# Patient Record
Sex: MALE | Race: ASIAN | NOT HISPANIC OR LATINO | Employment: OTHER | URBAN - METROPOLITAN AREA
[De-identification: names, ages, dates, MRNs, and addresses within clinical notes are randomized per-mention and may not be internally consistent; named-entity substitution may affect disease eponyms.]

---

## 2023-08-08 ENCOUNTER — APPOINTMENT (OUTPATIENT)
Dept: MRI IMAGING | Facility: HOSPITAL | Age: 63
DRG: 066 | End: 2023-08-08
Payer: MEDICAID

## 2023-08-08 ENCOUNTER — APPOINTMENT (OUTPATIENT)
Dept: CT IMAGING | Facility: HOSPITAL | Age: 63
DRG: 066 | End: 2023-08-08
Payer: MEDICAID

## 2023-08-08 ENCOUNTER — APPOINTMENT (OUTPATIENT)
Dept: GENERAL RADIOLOGY | Facility: HOSPITAL | Age: 63
DRG: 066 | End: 2023-08-08
Payer: MEDICAID

## 2023-08-08 ENCOUNTER — APPOINTMENT (OUTPATIENT)
Dept: CARDIOLOGY | Facility: HOSPITAL | Age: 63
DRG: 066 | End: 2023-08-08
Payer: MEDICAID

## 2023-08-08 ENCOUNTER — HOSPITAL ENCOUNTER (INPATIENT)
Facility: HOSPITAL | Age: 63
LOS: 6 days | Discharge: NURSING, REHAB OR LONG-TERM CARE | DRG: 066 | End: 2023-08-14
Attending: EMERGENCY MEDICINE | Admitting: INTERNAL MEDICINE
Payer: MEDICAID

## 2023-08-08 DIAGNOSIS — I63.9 CEREBROVASCULAR ACCIDENT (CVA), UNSPECIFIED MECHANISM: Primary | ICD-10-CM

## 2023-08-08 DIAGNOSIS — Z78.9 DECREASED ACTIVITIES OF DAILY LIVING (ADL): ICD-10-CM

## 2023-08-08 DIAGNOSIS — R13.12 DYSPHAGIA, OROPHARYNGEAL: ICD-10-CM

## 2023-08-08 DIAGNOSIS — R73.9 HYPERGLYCEMIA: ICD-10-CM

## 2023-08-08 DIAGNOSIS — I10 HYPERTENSION, UNSPECIFIED TYPE: ICD-10-CM

## 2023-08-08 DIAGNOSIS — R26.2 DIFFICULTY IN WALKING: ICD-10-CM

## 2023-08-08 LAB
25(OH)D3 SERPL-MCNC: 20.6 NG/ML (ref 30–100)
ALBUMIN SERPL-MCNC: 4.6 G/DL (ref 3.5–5.2)
ALBUMIN/GLOB SERPL: 1.4 G/DL
ALP SERPL-CCNC: 96 U/L (ref 39–117)
ALT SERPL W P-5'-P-CCNC: 25 U/L (ref 1–41)
ANION GAP SERPL CALCULATED.3IONS-SCNC: 13.9 MMOL/L (ref 5–15)
APTT PPP: 24.5 SECONDS (ref 24.2–34.2)
AST SERPL-CCNC: 23 U/L (ref 1–40)
BASOPHILS # BLD AUTO: 0.03 10*3/MM3 (ref 0–0.2)
BASOPHILS NFR BLD AUTO: 0.5 % (ref 0–1.5)
BILIRUB SERPL-MCNC: 0.5 MG/DL (ref 0–1.2)
BILIRUB UR QL STRIP: NEGATIVE
BUN SERPL-MCNC: 15 MG/DL (ref 8–23)
BUN/CREAT SERPL: 13.4 (ref 7–25)
CALCIUM SPEC-SCNC: 10.9 MG/DL (ref 8.6–10.5)
CHLORIDE SERPL-SCNC: 95 MMOL/L (ref 98–107)
CLARITY UR: CLEAR
CO2 SERPL-SCNC: 25.1 MMOL/L (ref 22–29)
COLOR UR: YELLOW
CREAT SERPL-MCNC: 1.12 MG/DL (ref 0.76–1.27)
DEPRECATED RDW RBC AUTO: 39.6 FL (ref 37–54)
EGFRCR SERPLBLD CKD-EPI 2021: 73.8 ML/MIN/1.73
EOSINOPHIL # BLD AUTO: 0.04 10*3/MM3 (ref 0–0.4)
EOSINOPHIL NFR BLD AUTO: 0.6 % (ref 0.3–6.2)
ERYTHROCYTE [DISTWIDTH] IN BLOOD BY AUTOMATED COUNT: 12.7 % (ref 12.3–15.4)
GLOBULIN UR ELPH-MCNC: 3.4 GM/DL
GLUCOSE BLDC GLUCOMTR-MCNC: 178 MG/DL (ref 70–99)
GLUCOSE BLDC GLUCOMTR-MCNC: 199 MG/DL (ref 70–99)
GLUCOSE BLDC GLUCOMTR-MCNC: 441 MG/DL (ref 70–99)
GLUCOSE SERPL-MCNC: 425 MG/DL (ref 65–99)
GLUCOSE UR STRIP-MCNC: ABNORMAL MG/DL
HCT VFR BLD AUTO: 40.7 % (ref 37.5–51)
HGB BLD-MCNC: 14.7 G/DL (ref 13–17.7)
HGB UR QL STRIP.AUTO: NEGATIVE
HOLD SPECIMEN: NORMAL
HOLD SPECIMEN: NORMAL
IMM GRANULOCYTES # BLD AUTO: 0.01 10*3/MM3 (ref 0–0.05)
IMM GRANULOCYTES NFR BLD AUTO: 0.2 % (ref 0–0.5)
INR PPP: 0.97 (ref 0.86–1.15)
KETONES UR QL STRIP: NEGATIVE
LEUKOCYTE ESTERASE UR QL STRIP.AUTO: NEGATIVE
LYMPHOCYTES # BLD AUTO: 1.86 10*3/MM3 (ref 0.7–3.1)
LYMPHOCYTES NFR BLD AUTO: 29.3 % (ref 19.6–45.3)
MCH RBC QN AUTO: 30.8 PG (ref 26.6–33)
MCHC RBC AUTO-ENTMCNC: 36.1 G/DL (ref 31.5–35.7)
MCV RBC AUTO: 85.3 FL (ref 79–97)
MONOCYTES # BLD AUTO: 0.47 10*3/MM3 (ref 0.1–0.9)
MONOCYTES NFR BLD AUTO: 7.4 % (ref 5–12)
NEUTROPHILS NFR BLD AUTO: 3.93 10*3/MM3 (ref 1.7–7)
NEUTROPHILS NFR BLD AUTO: 62 % (ref 42.7–76)
NITRITE UR QL STRIP: NEGATIVE
NRBC BLD AUTO-RTO: 0 /100 WBC (ref 0–0.2)
PH UR STRIP.AUTO: 6 [PH] (ref 5–8)
PHOSPHATE SERPL-MCNC: 3.6 MG/DL (ref 2.5–4.5)
PLATELET # BLD AUTO: 192 10*3/MM3 (ref 140–450)
PMV BLD AUTO: 11.3 FL (ref 6–12)
POTASSIUM SERPL-SCNC: 4.2 MMOL/L (ref 3.5–5.2)
PROT SERPL-MCNC: 8 G/DL (ref 6–8.5)
PROT UR QL STRIP: NEGATIVE
PROTHROMBIN TIME: 13 SECONDS (ref 11.8–14.9)
PTH-INTACT SERPL-MCNC: 42.6 PG/ML (ref 15–65)
QT INTERVAL: 363 MS
RBC # BLD AUTO: 4.77 10*6/MM3 (ref 4.14–5.8)
SODIUM SERPL-SCNC: 134 MMOL/L (ref 136–145)
SP GR UR STRIP: >1.03 (ref 1–1.03)
TROPONIN T SERPL HS-MCNC: 16 NG/L
UROBILINOGEN UR QL STRIP: ABNORMAL
WBC NRBC COR # BLD: 6.34 10*3/MM3 (ref 3.4–10.8)
WHOLE BLOOD HOLD COAG: NORMAL
WHOLE BLOOD HOLD SPECIMEN: NORMAL

## 2023-08-08 PROCEDURE — 0042T HC CT CEREBRAL PERFUSION W/WO CONTRAST: CPT

## 2023-08-08 PROCEDURE — 93880 EXTRACRANIAL BILAT STUDY: CPT | Performed by: SURGERY

## 2023-08-08 PROCEDURE — 81003 URINALYSIS AUTO W/O SCOPE: CPT | Performed by: EMERGENCY MEDICINE

## 2023-08-08 PROCEDURE — 70496 CT ANGIOGRAPHY HEAD: CPT

## 2023-08-08 PROCEDURE — 85610 PROTHROMBIN TIME: CPT | Performed by: EMERGENCY MEDICINE

## 2023-08-08 PROCEDURE — 82948 REAGENT STRIP/BLOOD GLUCOSE: CPT

## 2023-08-08 PROCEDURE — 70450 CT HEAD/BRAIN W/O DYE: CPT

## 2023-08-08 PROCEDURE — 93880 EXTRACRANIAL BILAT STUDY: CPT

## 2023-08-08 PROCEDURE — 82306 VITAMIN D 25 HYDROXY: CPT | Performed by: INTERNAL MEDICINE

## 2023-08-08 PROCEDURE — 84484 ASSAY OF TROPONIN QUANT: CPT | Performed by: EMERGENCY MEDICINE

## 2023-08-08 PROCEDURE — 83036 HEMOGLOBIN GLYCOSYLATED A1C: CPT | Performed by: INTERNAL MEDICINE

## 2023-08-08 PROCEDURE — 71045 X-RAY EXAM CHEST 1 VIEW: CPT

## 2023-08-08 PROCEDURE — 85025 COMPLETE CBC W/AUTO DIFF WBC: CPT | Performed by: EMERGENCY MEDICINE

## 2023-08-08 PROCEDURE — 85730 THROMBOPLASTIN TIME PARTIAL: CPT | Performed by: EMERGENCY MEDICINE

## 2023-08-08 PROCEDURE — 63710000001 INSULIN REGULAR HUMAN PER 5 UNITS: Performed by: EMERGENCY MEDICINE

## 2023-08-08 PROCEDURE — 99285 EMERGENCY DEPT VISIT HI MDM: CPT

## 2023-08-08 PROCEDURE — 99222 1ST HOSP IP/OBS MODERATE 55: CPT | Performed by: PSYCHIATRY & NEUROLOGY

## 2023-08-08 PROCEDURE — 80053 COMPREHEN METABOLIC PANEL: CPT | Performed by: EMERGENCY MEDICINE

## 2023-08-08 PROCEDURE — 94761 N-INVAS EAR/PLS OXIMETRY MLT: CPT

## 2023-08-08 PROCEDURE — 83970 ASSAY OF PARATHORMONE: CPT | Performed by: INTERNAL MEDICINE

## 2023-08-08 PROCEDURE — 94799 UNLISTED PULMONARY SVC/PX: CPT

## 2023-08-08 PROCEDURE — 70551 MRI BRAIN STEM W/O DYE: CPT

## 2023-08-08 PROCEDURE — 99223 1ST HOSP IP/OBS HIGH 75: CPT | Performed by: INTERNAL MEDICINE

## 2023-08-08 PROCEDURE — 70498 CT ANGIOGRAPHY NECK: CPT

## 2023-08-08 PROCEDURE — 84100 ASSAY OF PHOSPHORUS: CPT | Performed by: INTERNAL MEDICINE

## 2023-08-08 PROCEDURE — 25510000001 IOPAMIDOL PER 1 ML

## 2023-08-08 PROCEDURE — 93005 ELECTROCARDIOGRAM TRACING: CPT | Performed by: EMERGENCY MEDICINE

## 2023-08-08 PROCEDURE — 25510000001 IOPAMIDOL PER 1 ML: Performed by: EMERGENCY MEDICINE

## 2023-08-08 RX ORDER — ASPIRIN 81 MG/1
81 TABLET, CHEWABLE ORAL DAILY
Status: DISCONTINUED | OUTPATIENT
Start: 2023-08-09 | End: 2023-08-14 | Stop reason: HOSPADM

## 2023-08-08 RX ORDER — SODIUM CHLORIDE 0.9 % (FLUSH) 0.9 %
10 SYRINGE (ML) INJECTION EVERY 12 HOURS SCHEDULED
Status: DISCONTINUED | OUTPATIENT
Start: 2023-08-08 | End: 2023-08-14 | Stop reason: HOSPADM

## 2023-08-08 RX ORDER — DEXTROSE MONOHYDRATE 25 G/50ML
25 INJECTION, SOLUTION INTRAVENOUS
Status: DISCONTINUED | OUTPATIENT
Start: 2023-08-08 | End: 2023-08-14 | Stop reason: HOSPADM

## 2023-08-08 RX ORDER — ASPIRIN 81 MG/1
81 TABLET, CHEWABLE ORAL ONCE
Status: DISCONTINUED | OUTPATIENT
Start: 2023-08-08 | End: 2023-08-08

## 2023-08-08 RX ORDER — ATORVASTATIN CALCIUM 40 MG/1
80 TABLET, FILM COATED ORAL NIGHTLY
Status: DISCONTINUED | OUTPATIENT
Start: 2023-08-08 | End: 2023-08-14 | Stop reason: HOSPADM

## 2023-08-08 RX ORDER — HYDRALAZINE HYDROCHLORIDE 20 MG/ML
10 INJECTION INTRAMUSCULAR; INTRAVENOUS EVERY 6 HOURS PRN
Status: DISCONTINUED | OUTPATIENT
Start: 2023-08-08 | End: 2023-08-14 | Stop reason: HOSPADM

## 2023-08-08 RX ORDER — SODIUM CHLORIDE 9 MG/ML
40 INJECTION, SOLUTION INTRAVENOUS AS NEEDED
Status: DISCONTINUED | OUTPATIENT
Start: 2023-08-08 | End: 2023-08-14 | Stop reason: HOSPADM

## 2023-08-08 RX ORDER — NICOTINE POLACRILEX 4 MG
15 LOZENGE BUCCAL
Status: DISCONTINUED | OUTPATIENT
Start: 2023-08-08 | End: 2023-08-14 | Stop reason: HOSPADM

## 2023-08-08 RX ORDER — CLOPIDOGREL BISULFATE 75 MG/1
75 TABLET ORAL DAILY
Status: DISCONTINUED | OUTPATIENT
Start: 2023-08-09 | End: 2023-08-14 | Stop reason: HOSPADM

## 2023-08-08 RX ORDER — ASPIRIN 300 MG/1
300 SUPPOSITORY RECTAL DAILY
Status: DISCONTINUED | OUTPATIENT
Start: 2023-08-09 | End: 2023-08-09

## 2023-08-08 RX ORDER — ENOXAPARIN SODIUM 100 MG/ML
40 INJECTION SUBCUTANEOUS DAILY
Status: DISCONTINUED | OUTPATIENT
Start: 2023-08-09 | End: 2023-08-14 | Stop reason: HOSPADM

## 2023-08-08 RX ORDER — SODIUM CHLORIDE 0.9 % (FLUSH) 0.9 %
10 SYRINGE (ML) INJECTION AS NEEDED
Status: DISCONTINUED | OUTPATIENT
Start: 2023-08-08 | End: 2023-08-14 | Stop reason: HOSPADM

## 2023-08-08 RX ORDER — CLOPIDOGREL BISULFATE 75 MG/1
300 TABLET ORAL ONCE
Status: DISCONTINUED | OUTPATIENT
Start: 2023-08-08 | End: 2023-08-08

## 2023-08-08 RX ADMIN — IOPAMIDOL 80 ML: 755 INJECTION, SOLUTION INTRAVENOUS at 12:42

## 2023-08-08 RX ADMIN — Medication 10 ML: at 22:00

## 2023-08-08 RX ADMIN — IOPAMIDOL 80 ML: 755 INJECTION, SOLUTION INTRAVENOUS at 12:32

## 2023-08-08 RX ADMIN — INSULIN HUMAN 10 UNITS: 100 INJECTION, SOLUTION PARENTERAL at 15:49

## 2023-08-08 NOTE — H&P
[facility]   HOSPITALIST HISTORY AND PHYSICAL  Date: 2023   Patient Name: Italo Pina  : 1960  MRN: [id number]  Primary Care Physician:  Carmen, No Known  Date of admission: 2023    Subjective   Subjective     Chief Complaint: Trouble swallowing    HPI:    Italo Pina is a 63 y.o. male with no reported past medical history who presented with trouble swallowing and slurred speech. His family at bedside provides additional history. At approximately 0930 today, patients wife noticed a right facial droop and slurred speech. Last known normal time was 1730 last night although of note, patient had some trouble swallowing last night which is not normal for him.     On presentation afebrile.  Heart and respiratory rate within normal limits.  Blood pressure 200/125.  No known history of hypertension and does not take any medications.  He is not a candidate for thrombolytics.  CT head without contrast did not report acute infarct or hemorrhage.  CT cerebral perfusion study no core infarction noted.  CT angiogram head and neck no large vessel occlusion or hemodynamically significant stenoses but there was diffuse atherosclerotic changes and narrowing noted within the cavernous segments of the internal carotid arteries bilaterally.  Labs notable for a glucose of 425 and calcium of 10.9. Teleneurology was consulted in the emergency room.  He was admitted under the hospitalist service for further evaluation and management.    Personal History     Past Medical History:  No known past medical history    Past Surgical History:  Knee arthroscopy    Family History:   Mom: Heart disease and hypertension    Social History:   No alcohol use  Former smoker quit in 2017    Home Medications:    NONE    Allergies:  No Known Allergies    Review of Systems  Constitutional:  No Fever, No Chills  HEENT:  +Trouble swallowing, No Hearing Changes, No Visual changes  Cardiovascular:  Denied complaints  Respiratory:   Denied complaints  Gastrointestinal:  Denied complaints  Genitourinary:  Denied complaints  Musculoskeletal:  Denied complaints  Neuro:  No headache, No confusion, No focal weakness, no change or loss of sensation in extremities, +unsteady gait  Heme/Lymph:  Denied complaints  Endocrine: + Polyuria and polydipsia    Objective   Objective     Vitals:   Temp:  [98.4 øF (36.9 øC)] 98.4 øF (36.9 øC)  Heart Rate:  [79-91] 86  Resp:  [16-18] 16  BP: (165-200)/(112-125) 189/116    Physical Exam    Constitutional: Awake, conversant, NAD   Eyes: Pupils equal and reactive, EOMI, no conjunctival injection   HENT: NCAT, nares patent, MMM   Neck: Supple, trachea midline   Respiratory: Equal and clear to auscultation bilaterally, nonlabored respirations    Cardiovascular: RRR, no murmurs, no pedal edema   Gastrointestinal: Positive bowel sounds, soft, nontender, nondistended   Musculoskeletal: No gross joint deformities, no clubbing or cyanosis to extremities   Neurologic: Alert, Cranial Nerves grossly intact, mild dysarthria   Skin: Warm, no rashes or wounds     Result Review    Result Review:  I have personally reviewed the results from the time of this admission to 8/8/2023 15:22 EDT and agree with these findings:  [x]  Laboratory  CBC          8/8/2023    12:33   CBC   WBC 6.34    RBC 4.77    Hemoglobin 14.7    Hematocrit 40.7    MCV 85.3    MCH 30.8    MCHC 36.1    RDW 12.7    Platelets 192      CMP          8/8/2023    12:33   CMP   Glucose 425    BUN 15    Creatinine 1.12    EGFR 73.8    Sodium 134    Potassium 4.2    Chloride 95    Calcium 10.9    Total Protein 8.0    Albumin 4.6    Globulin 3.4    Total Bilirubin 0.5    Alkaline Phosphatase 96    AST (SGOT) 23    ALT (SGPT) 25    Albumin/Globulin Ratio 1.4    BUN/Creatinine Ratio 13.4    Anion Gap 13.9        []  Microbiology  [x]  Radiology    CT Angiogram Neck  Result Date: 8/8/2023    1. No evidence of large vessel occlusion or aneurysm formation noted.   Atherosclerotic changes noted as above most notable within the left-sided vertebral artery at the base of the brain.  Diffuse atherosclerotic changes and narrowing noted within the cavernous segments of the internal carotid arteries bilaterally.  Atherosclerotic changes without flow limiting stenosis noted within the carotid arteries as above 2. Incidental note of heterogeneous appearance of the right palatine tonsil which could represent underlying tonsillitis.  Recommend clinical correlation and follow-up as clinically indicated.  Consider direct visualization.     DEVEN BURROUGHS MD       Electronically Signed and Approved By: DEVEN BURROUGHS MD on 8/08/2023 at 13:59             XR Chest 1 View  Result Date: 8/8/2023    No radiographic findings of acute cardiopulmonary abnormality.       BERNA ASHLEY MD       Electronically Signed and Approved By: BERNA ASHLEY MD on 8/08/2023 at 13:27             CT Head Without Contrast Stroke Protocol  Result Date: 8/8/2023    1. No acute hemorrhage or mass effect 2. Extensive white matter changes within the supratentorial brain compatible small-vessel ischemic disease.  Age-indeterminate small focus of low attenuation on the right side of the kenia.  MRI would be more sensitive for evaluation of acute ischemic change   Findings were communicated to the emergency department attending covering the patient at 12:09 p.m.     DEVEN BURROUGHS MD       Electronically Signed and Approved By: DEVEN BURROUGHS MD on 8/08/2023 at 12:12             CT Angiogram Head w AI Analysis of LVO  Result Date: 8/8/2023    1. No evidence of large vessel occlusion or aneurysm formation noted.  Atherosclerotic changes noted as above most notable within the left-sided vertebral artery at the base of the brain.  Diffuse atherosclerotic changes and narrowing noted within the cavernous segments of the internal carotid arteries bilaterally.  Atherosclerotic changes without flow limiting stenosis noted  within the carotid arteries as above 2. Incidental note of heterogeneous appearance of the right palatine tonsil which could represent underlying tonsillitis.  Recommend clinical correlation and follow-up as clinically indicated.  Consider direct visualization.     DEVEN BURROUGHS MD       Electronically Signed and Approved By: DEVEN BURROUGHS MD on 8/08/2023 at 13:59             CT CEREBRAL PERFUSION WITH & WITHOUT CONTRAST    Result Date: 8/8/2023      1. Examination somewhat degraded by patient motion artifact 2. 5 mL focus of prolonged T-max greater than 6 seconds in the posterosuperior cerebellum suspected to be artifactual related to the motion.  Ischemic brain at risk would also be in the differential.      Bill Barker M.D.       Electronically Signed and Approved By: Bill Barker M.D. on 8/08/2023 at 12:55              [x]  EKG/Telemetry monitor reviewed: Normal sinus rhythm  []  Cardiology/Vascular   []  Pathology  []  Old records  []  Other:      Assessment & Plan   Assessment / Plan     Assessment:  Dysphagia  Dysarthria  Concern for CVA  Uncontrolled hypertension  Hyperglycemia  Hypercalcemia      Plan:     Admit to hospitalist service.  Telemetry bed  Teleneurology consulted, appreciate recommendations  Obtain MRI brain without contrast  Continue neurochecks/NIHSS per protocol  Continue dual antiplatelet therapy per neurology recommendation  Allow for permissive hypertension x24 hours.  IV antihypertensives prn for SBP/DBP greater than 220/120.  Check lipid panel.  Start high intensity statin.  Obtain TTE  Check carotid duplex ultrasound  Monitor on telemetry  Received 10 units of regular insulin in ED.  Will place on moderate dose SSI and check hemoglobin A1c.  Check phosphorus, vitamin D, PTH level  Failed bedside swallow evaluation.  Keep strict NPO pending SLP evaluation. Aspiration precautions.  PT/OT evaluation.  Fall precautions    DVT ppx: Lovenox 40 mg daily    CODE STATUS:    Code Status  (Patient has no pulse and is not breathing): CPR (Attempt to Resuscitate)  Medical Interventions (Patient has pulse or is breathing): Full Support    Admission Status:  I believe this patient meets INPATIENT status.    Electronically signed by Gaudencio Franz DO, 08/08/23, 3:22 PM EDT.

## 2023-08-08 NOTE — CONSULTS
[facility] Tele[facility]urology Consult Services      Patient Name:   Italo Pina  YOB: 1960  Identification Number:   MRN - [id number]  Date of Service:   08/08/2023 11:51:45    Diagnosis:        R26.81 - Unsteady gait    Impression:       Patient is a very pleasant 63-year-old man. He presents to the emergency department with complaints of dysphagia, unsteady gait as well as dysarthria. CT scan of the head is unremarkable. He was last known wall greater than 4.5 hours prior to presentation. He does not qualify for thrombolytics.      His symptoms are suspicious for a posterior circulation infarct. I would recommend an MRI scan of the brain without contrast. He will also need a CT angiogram head and neck and ECHO.      For the time being, I would recommended loading with Plavix 300 mg x1 in initiating aspirin 81 mg.      Further neurology recommendations can be made based on the results of his workup. Thank you for allowing me to participate in his care.    Our recommendations are outlined below.    Recommendations:          Stroke/Telemetry Floor        Neuro Checks        Bedside Swallow Eval        DVT Prophylaxis        IV Fluids, Normal Saline        Head of Bed 30 Degrees        Euglycemia and Avoid Hyperthermia (PRN Acetaminophen)        Bolus with Clopidogrel 300 mg bolus x1 and initiate dual antiplatelet therapy with Aspirin 81 mg daily and Clopidogrel 75 mg daily    Recommended Scan:       MRI Head Without Contrast       Echocardiogram - Transthoracic Echocardiogram    Lipid Panel to Be Obtained, if Not Done in the Last 30 Days    Therapies:        Physical Therapy, Occupational Therapy, Speech Therapy Assessment When Applicable    Dysphagia:        Swallow Evaluation, Bedside        NPO Until Swallow Evaluation    DVT prophylaxis:        Choice of Primary Team    Disposition:        Neurology Follow Up Recommended    Sign Out:        Discussed with Emergency Department  Provider        ------------------------------------------------------------------------------    Advanced Imaging:  Will be performed.      Metrics:  Last Known Well: 08/07/2023 19:00:00  [facility] Notification Time: 08/08/2023 11:51:26  Arrival Time: 08/08/2023 11:44:00  Stamp Time: 08/08/2023 11:51:45  Initial Response Time: 08/08/2023 11:56:33  Symptoms: dysphagia, unsteadiness.  Initial patient interaction: 08/08/2023 12:00:38  NIHSS Assessment Completed: 08/08/2023 12:03:01  Patient is not a candidate for Thrombolytic.  Thrombolytic Medical Decision: 08/08/2023 12:03:02  Patient was not deemed candidate for Thrombolytic because of following reasons:  Last Well Known Above 4.5 Hours.    I personally Reviewed the CT Head and it Showed No acute intracranial abnormality.    Narrative & Impression  PROCEDURE: CT HEAD WO CONTRAST STROKE PROTOCOL    COMPARISON: None  INDICATIONS: Neuro deficit, acute, stroke suspected. difficulty swallowing    PROTOCOL: Standard imaging protocol performed    RADIATION: DLP: 1018.2mGy*cm  MA and/or KV was adjusted to minimize radiation dose.      TECHNIQUE: After obtaining the patient's consent, CT images were obtained without non-ionic  intravenous contrast material.    FINDINGS:  Brain: Mild diffuse atrophy noted. No suspicious extra-axial fluid collections noted. No acute  intracranial hemorrhage or mass effect. Moderate to advanced patchy confluent white matter changes  within the supratentorial brain compatible with small vessel ischemic disease in this age group.    Orbits: Orbits are grossly unremarkable and periorbital soft tissues appear grossly unremarkable.    Sinuses: Paranasal sinuses are clear. Mastoid air cells are clear.    Calvarium and soft tissues: Bony calvarium is intact. Soft tissues are grossly unremarkable in  appearance.      IMPRESSION:  1. No acute hemorrhage or mass effect  2. Extensive white matter changes within the supratentorial brain compatible  small-vessel ischemic  disease. Age-indeterminate small focus of low attenuation on the right side of the kenia. MRI  would be more sensitive for evaluation of acute ischemic change          Primary Provider Notified of Diagnostic Impression and Management Plan on: 08/08/2023 12:16:32        ------------------------------------------------------------------------------    History of Present Illness:  Patient is a 63 year old Male.    Patient was brought by EMS for symptoms of dysphagia, unsteadiness.  The patient is a very pleasant 63-year-old man. He presents to the emergency department with complaints of dysphagia, dysarthria, unsteady gait.    He was last known well last night at approximately 7 p.m., when he went to sleep. He does not take any blood thinners at home. Normally he does not need any assistive devices for balance.      Past Medical History:       There is no history of Stroke    Medications:    No Anticoagulant use   No Antiplatelet use  Reviewed EMR for current medications    Allergies:   Reviewed    Social History:  Smoking: No    Family History:    There is no family history of premature cerebrovascular disease pertinent to this consultation    ROS :  14 Points Review of Systems was performed and was negative except mentioned in HPI.    Past Surgical History:  There Is No Surgical History Contributory To Today's Visit        Examination:  BP(200/125), Pulse(91),  1A: Level of Consciousness - Alert; keenly responsive + 0  1B: Ask Month and Age - Both Questions Right + 0  1C: Blink Eyes & Squeeze Hands - Performs Both Tasks + 0  2: Test Horizontal Extraocular Movements - Normal + 0  3: Test Visual Fields - No Visual Loss + 0  4: Test Facial Palsy (Use Grimace if Obtunded) - Normal symmetry + 0  5A: Test Left Arm Motor Drift - Drift, but doesn't hit bed + 1  5B: Test Right Arm Motor Drift - No Drift for 10 Seconds + 0  6A: Test Left Leg Motor Drift - No Drift for 5 Seconds + 0  6B: Test Right Leg  Motor Drift - No Drift for 5 Seconds + 0  7: Test Limb Ataxia (FNF/Heel-Shin) - No Ataxia + 0  8: Test Sensation - Normal; No sensory loss + 0  9: Test Language/Aphasia - Normal; No aphasia + 0  10: Test Dysarthria - Mild-Moderate Dysarthria: Slurring but can be understood + 1  11: Test Extinction/Inattention - No abnormality + 0    NIHSS Score: 2    Pre-Morbid Modified Edvin Scale:  0 Points = No symptoms at all      Patient/Family was informed the Neurology Consult would occur via TeleHealth consult by way of interactive audio and video telecommunications and consented to receiving care in this manner.      Patient is being evaluated for possible acute neurologic impairment and high probability of imminent or life-threatening deterioration. I spent total of 35 minutes providing care to this patient, including time for face to face visit via telemedicine, review of medical records, imaging studies and discussion of findings with providers, the patient and/or family.      Dr Lina Gilbert      [facility]  For Inpatient follow-up with [facility] physician please call [facility] [phone]. This is not an outpatient service. Post hospital discharge, please contact hospital directly.

## 2023-08-08 NOTE — ED TRIAGE NOTES
Wife states that pt displayed right sided facial droop and slurred speech this morning at approx 0930. Last known well : 1930 last night.

## 2023-08-08 NOTE — ED PROVIDER NOTES
Time: 2:30 PM EDT  Date of encounter:  2023  Independent Historian/Clinical History and Information was obtained by:   Patient and wife  Chief Complaint: Facial droop and slurring of speech    History is limited by:  Slurred speech    History of Present Illness:  Patient is a 63 y.o. year old male who presents to the emergency department for evaluation of facial droop and slurred speech.  Wife noticed this morning around 8:40 AM the patient had facial droop as well as slurring of his words.  The wife states that she got up this morning and went to the gym and did not see him.  She states when she first saw him at 840 his speech was incomprehensible.  Patient gave him 4 baby aspirin at this time.  She then took him to an urgent care and then was referred to the ER for further evaluation.  Patient has never had this happen before.  The wife stated the only thing was last night around 7:30 PM they were eating dinner and he was having a hard time swallowing at dinnertime.    HPI    Patient Care Team  Primary Care Provider: Provider, No Known    Past Medical History:     No Known Allergies  Past Medical History:   Diagnosis Date    Hypertension      Past Surgical History:   Procedure Laterality Date    KNEE CARTILAGE SURGERY Right      Family History   Problem Relation Age of Onset    Hypertension Mother     Diabetes type II Mother     Heart valve disorder Mother     Stroke Sister     Hypertension Brother     Diabetes type II Brother     Kidney disease Brother     Diabetes type II Daughter        Home Medications:  Prior to Admission medications    Not on File        Social History:   Social History     Tobacco Use    Smoking status: Former     Packs/day: 1.00     Types: Cigarettes     Quit date:      Years since quittin.6     Passive exposure: Past    Smokeless tobacco: Former   Vaping Use    Vaping Use: Former    Quit date: 2017    Substances: Nicotine    Devices: Disposable   Substance Use Topics     "Alcohol use: Never    Drug use: Never         Review of Systems:  Review of Systems   Constitutional:  Negative for chills and fever.   HENT:  Negative for congestion, ear pain and sore throat.    Eyes:  Negative for pain.   Respiratory:  Negative for cough, chest tightness and shortness of breath.    Cardiovascular:  Negative for chest pain.   Gastrointestinal:  Negative for abdominal pain, diarrhea, nausea and vomiting.   Genitourinary:  Negative for flank pain and hematuria.   Musculoskeletal:  Negative for joint swelling.   Skin:  Negative for pallor.   Neurological:  Positive for facial asymmetry and speech difficulty. Negative for seizures and headaches.   All other systems reviewed and are negative.     Physical Exam:  BP (!) 176/102 (BP Location: Right arm, Patient Position: Lying)   Pulse 82   Temp 97.9 øF (36.6 øC) (Oral)   Resp 16   Ht 167.6 cm (66\")   Wt 74.1 kg (163 lb 5.8 oz)   SpO2 98%   BMI 26.37 kg/mý     Physical Exam      Vital signs were reviewed under triage note.  General appearance - Patient appears well-developed and well-nourished.  Patient is in no acute distress.  Head - Normocephalic, atraumatic.  Pupils - Equal, round, reactive to light.  Extraocular muscles are intact.  Conjunctiva is clear.  Nasal - Normal inspection.  No evidence of trauma or epistaxis.  Tympanic membranes - Gray, intact without erythema or retractions.  Oral mucosa - Pink and moist without lesions or erythema.  Uvula is midline.  Chest wall - Atraumatic.  Chest wall is nontender.  There are no vesicular rashes noted.  Neck - Supple.  Trachea was midline.  There is no palpable lymphadenopathy or thyromegaly.  There are no meningeal signs  Lungs - Clear to auscultation and percussion bilaterally.  Heart - Regular rate and rhythm without any murmurs, clicks, or gallops.  Abdomen - Soft.  Bowel sounds are present.  There is no palpable tenderness.  There is no rebound, guarding, or rigidity.  There are no palpable " masses.  There are no pulsatile masses.  Back - Spine is straight and midline.  There is no CVA tenderness.  Extremities - Intact x4 with full range of motion.  There is no palpable edema.  Pulses are intact x4 and equal.  Neurologic - Patient is awake, alert, and oriented x3.  Cranial nerves II through XII are grossly intact.  There is no obvious facial asymmetry or facial droop at this time.  Motor and sensory functions grossly intact but the patient did have a mild drift of his left arm.  Cerebellar function was normal.  Patient has some mild dysarthria/slurring  Integument - There are no rashes.  There are no petechia or purpura lesions noted.  There are no vesicular lesions noted.      Procedures:  Procedures      Medical Decision Making:      Comorbidities that affect care:    Hypertension    External Notes reviewed:    None      The following orders were placed and all results were independently analyzed by me:  Orders Placed This Encounter   Procedures    CT Head Without Contrast Stroke Protocol    CT Angiogram Head w AI Analysis of LVO    CT Angiogram Neck    CT CEREBRAL PERFUSION WITH & WITHOUT CONTRAST    XR Chest 1 View    MRI Brain Without Contrast    CT Head Without Contrast    FL Video Swallow With Speech Single Contrast    [place] Draw    Comprehensive Metabolic Panel    Protime-INR    aPTT    Single High Sensitivity Troponin T    Urinalysis With Microscopic If Indicated (No Culture) - Urine, Clean Catch    CBC Auto Differential    Hemoglobin A1c    Lipid Panel    Vitamin D,25-Hydroxy    PTH, Intact    Phosphorus    Comprehensive Metabolic Panel    Magnesium    Basic Metabolic Panel    TSH Rfx On Abnormal To Free T4    MicroAlbumin, Urine, Random - Urine, Clean Catch    Basic Metabolic Panel    Diet: Diabetic Diets; Consistent Carbohydrate; Texture: Mechanical Ground (NDD 2); Fluid Consistency: Nectar Thick    Initiate Department's Acute Stroke Process (Team D, Code 19, etc)    Perform NIH Stroke  Scale    Measure Actual Weight    Head of Bed 30 Degrees or Less    Undress and Gown    Vital Signs    Neuro Checks    Notify MD for SBP < 80 or > 200    Notify Provider for SBP greater than 140 if hemorrhagic Stroke    No Hypotonic Fluids    Nursing Dysphagia Screening (Complete Prior to Giving anything PO)    RN to Place Order SLP Consult (IF swallow screen failed) - Eval & Treat Choosing Reason of RN Dysphagia Screen Failed    Vital Signs    Pulse Oximetry, Continuous    Telemetry - Place Orders & Notify Provider of Results When Patient Experiences Acute Chest Pain, Dysrhythmia or Respiratory Distress    Notify Provider    Nursing Dysphagia Screening (Complete Prior to Giving Anything By Mouth)    RN to Place Order SLP Consult - Eval & Treat Choosing Reason of RN Dysphagia Screen Failed    Nurse to Call MD or Nutrition Services for Diet if Patient Passes Dysphagia Screen    Intake and Output    Neuro Checks    NIHSS Assessment    Order CT Head Without Contrast for Neurological Decline    Provide Stroke Education Material    Saline Lock & Maintain IV Access    Place Sequential Compression Device    Maintain Sequential Compression Device    Activity - Strict Bed Rest with HOB Flat    Code Status and Medical Interventions:    Hospitalist (on-call MD unless specified)    Inpatient Case Management  Consult    Inpatient Nutrition Consult    Inpatient Diabetes Educator Consult    Inpatient Neurology Consult Stroke    OT Consult: Eval & Treat    PT Consult: Eval & Treat As Tolerated    Oxygen Therapy- Nasal Cannula; Titrate 1-6 LPM Per SpO2; 90 - 95%    SLP Consult: Eval & Treat RN Dysphagia Screen Failed    SLP Consult: Eval & Treat Communication Disorder    POC Glucose Once    POC Glucose 4x Daily Before Meals & at Bedtime    POC Glucose Once    POC Glucose Once    POC Glucose Once    POC Glucose Once    POC Glucose Once    POC Glucose Once    POC Glucose Once    POC Glucose Once    POC Glucose Once     POC Glucose Once    POC Glucose Once    ECG 12 Lead ED Triage Standing Order; Acute Stroke (Onset <12 hrs)    SCANNED - TELEMETRY      SCANNED - TELEMETRY      SCANNED - TELEMETRY      Adult Transthoracic Echo Complete W/ Cont if Necessary Per Protocol    Insert Large Bore Peripheral IV - Right AC Preferred    Insert Peripheral IV    Inpatient Admission    CBC & Differential    Green Top (Gel)    Lavender Top    Gold Top - SST    Light Blue Top    Extra Tubes    Lavender Top       Medications Given in the Emergency Department:  Medications   sodium chloride 0.9 % flush 10 mL (has no administration in time range)   sodium chloride 0.9 % flush 10 mL (10 mL Intravenous Given 8/10/23 2032)   sodium chloride 0.9 % flush 10 mL (has no administration in time range)   sodium chloride 0.9 % infusion 40 mL (has no administration in time range)   atorvastatin (LIPITOR) tablet 80 mg (80 mg Oral Given 8/10/23 2032)   Enoxaparin Sodium (LOVENOX) syringe 40 mg (40 mg Subcutaneous Given 8/10/23 0859)   aspirin chewable tablet 81 mg (81 mg Oral Given 8/10/23 0859)   clopidogrel (PLAVIX) tablet 75 mg (75 mg Oral Given 8/10/23 0859)   hydrALAZINE (APRESOLINE) injection 10 mg (has no administration in time range)   dextrose (GLUTOSE) oral gel 15 g (has no administration in time range)   dextrose (D50W) (25 g/50 mL) IV injection 25 g (has no administration in time range)   glucagon (GLUCAGEN) injection 1 mg (has no administration in time range)   barium sulfate oral suspension 50 mL (has no administration in time range)   Barium Sulfate 60 % cream 1 teaspoon(s) (has no administration in time range)   barium sulfate (VARIBAR THIN LIQUID) oral suspension 55 mL (has no administration in time range)   cholecalciferol (VITAMIN D3) tablet 1,000 Units (1,000 Units Oral Given 8/10/23 0859)   hydroCHLOROthiazide (HYDRODIURIL) tablet 25 mg (25 mg Oral Given 8/10/23 0859)   insulin regular (humuLIN R,novoLIN R) injection 2-9 Units (6 Units  Subcutaneous Given 8/10/23 2046)   insulin detemir (LEVEMIR) injection 15 Units (15 Units Subcutaneous Given 8/10/23 2037)   iopamidol (ISOVUE-370) 76 % injection 100 mL (80 mL Intravenous Given 8/8/23 1232)   iopamidol (ISOVUE-370) 76 % injection 100 mL (80 mL Intravenous Given 8/8/23 1242)   insulin regular (humuLIN R,novoLIN R) injection 10 Units (10 Units Intravenous Given 8/8/23 1549)   potassium chloride (MICRO-K) CR capsule 40 mEq (40 mEq Oral Given 8/9/23 1806)        ED Course:    The patient was seen and evaluated in the ED by me.  The above history and physical examination was performed as documented.  Diagnostic data was obtained.  Results reviewed.  Discussed with the patient and his wife.  The patient's history is concerning for that of a TIA or mild stroke.  Patient's symptoms are improving additionally patient's last known well is greater than 4 and half hours and thus he is not a thrombolytic candidate.  Teleneurology consult was obtained.  Patient will be admitted for further stroke workup and treatment.  Teleneurologist reviewed recommended Plavix 300 mg loading dose and aspirin 81 mg however the patient failed his dysphagia screen and he is not able to take p.o. at this time.    Labs:    Lab Results (last 24 hours)       Procedure Component Value Units Date/Time    POC Glucose Once [[id number]]  (Abnormal) Collected: 08/10/23 0101    Specimen: Blood Updated: 08/10/23 0103     Glucose 244 mg/dL      Comment: Serial Number: [id number]:  [id number]       Basic Metabolic Panel [[id number]]  (Abnormal) Collected: 08/10/23 0411    Specimen: Blood Updated: 08/10/23 0504     Glucose 204 mg/dL      BUN 19 mg/dL      Creatinine 1.12 mg/dL      Sodium 137 mmol/L      Potassium 3.5 mmol/L      Chloride 100 mmol/L      CO2 24.0 mmol/L      Calcium 9.8 mg/dL      BUN/Creatinine Ratio 17.0     Anion Gap 13.0 mmol/L      eGFR 73.8 mL/min/1.73     Narrative:      GFR Normal >60  Chronic Kidney Disease  <60  Kidney Failure <15      POC Glucose Once [[id number]]  (Abnormal) Collected: 08/10/23 0819    Specimen: Blood Updated: 08/10/23 0820     Glucose 234 mg/dL      Comment: Serial Number: [id number]:  [id number]       POC Glucose Once [[id number]]  (Abnormal) Collected: 08/10/23 1212    Specimen: Blood Updated: 08/10/23 1213     Glucose 250 mg/dL      Comment: Serial Number: [id number]:  [id number]       POC Glucose Once [[id number]]  (Abnormal) Collected: 08/10/23 1717    Specimen: Blood Updated: 08/10/23 1719     Glucose 103 mg/dL      Comment: Serial Number: [id number]:  [id number]       POC Glucose Once [[id number]]  (Abnormal) Collected: 08/10/23 2037    Specimen: Blood Updated: 08/10/23 2038     Glucose 282 mg/dL      Comment: Serial Number: [id number]:  [id number]                Imaging:    No Radiology Exams Resulted Within Past 24 Hours      Differential Diagnosis and Discussion:    Stroke: Differential diagnosis in this patient with signs of possible ischemic stroke include TIA or ischemic stroke, hemorrhagic stroke, hypoglycemic episode, toxic or metabolic encephalopathy, paresthesias.    All labs were reviewed and interpreted by me.  All X-rays impressions were independently interpreted by me.  EKG was interpreted by me.  CT scan radiology impression was interpreted by me.    [place]       Critical Care Note: Total Critical Care time of 40 minutes. Total critical care time documented does not include time spent on separately billed procedures for services of nurses or physician assistants. I personally saw and examined the patient. I have reviewed all diagnostic interpretations and treatment plans as written. I was present for the key portions of any procedures performed and the inclusive time noted in any critical care statement. Critical care time includes patient management by me, time spent at the patients bedside,  time to review lab and imaging results, discussing patient care,  documentation in the medical record, and time spent with family or caregiver.    Patient Care Considerations:    MRI: I considered ordering an MRI however this can be done during inpatient workup with, evaluation, and treatment.      Consultants/Shared Management Plan:    Hospitalist: I have discussed the case with Dr. Franz who agrees to accept the patient for admission.  Consultant: I have discussed the case with teleneurology who agrees to consult on the patient.    Social Determinants of Health:    Patient has presented with family members who are responsible, reliable and will ensure follow up care.      Disposition and Care Coordination:    Admit:   Through independent evaluation of the patient's history, physical, and imperical data, the patient meets criteria for observation/admission to the hospital.        Final diagnoses:   Cerebrovascular accident (CVA), unspecified mechanism   Hyperglycemia   Hypertension, unspecified type        ED Disposition       ED Disposition   Decision to Admit    Condition   --    Comment   Level of Care: Telemetry [5]   Diagnosis: CVA (cerebral vascular accident) [076122]   Admitting Physician: KYLAH FRANZ [[id number]]   Certification: I Certify That Inpatient Hospital Services Are Medically Necessary For Greater Than 2 Midnights                 This medical record created using voice recognition software.             Niranjan Slaughter DO  08/10/23 9551

## 2023-08-09 ENCOUNTER — APPOINTMENT (OUTPATIENT)
Dept: GENERAL RADIOLOGY | Facility: HOSPITAL | Age: 63
DRG: 066 | End: 2023-08-09
Payer: MEDICAID

## 2023-08-09 ENCOUNTER — APPOINTMENT (OUTPATIENT)
Dept: CARDIOLOGY | Facility: HOSPITAL | Age: 63
DRG: 066 | End: 2023-08-09
Payer: MEDICAID

## 2023-08-09 LAB
ALBUMIN SERPL-MCNC: 4 G/DL (ref 3.5–5.2)
ALBUMIN/GLOB SERPL: 1.3 G/DL
ALP SERPL-CCNC: 77 U/L (ref 39–117)
ALT SERPL W P-5'-P-CCNC: 23 U/L (ref 1–41)
ANION GAP SERPL CALCULATED.3IONS-SCNC: 13.4 MMOL/L (ref 5–15)
AST SERPL-CCNC: 22 U/L (ref 1–40)
BH CV ECHO MEAS - AO ROOT DIAM: 2.9 CM
BH CV ECHO MEAS - EDV(CUBED): 58.9 ML
BH CV ECHO MEAS - EDV(MOD-SP2): 43.2 ML
BH CV ECHO MEAS - EDV(MOD-SP4): 27.7 ML
BH CV ECHO MEAS - EF(MOD-BP): 57.5 %
BH CV ECHO MEAS - EF(MOD-SP2): 71.1 %
BH CV ECHO MEAS - EF(MOD-SP4): 34.7 %
BH CV ECHO MEAS - ESV(CUBED): 9.8 ML
BH CV ECHO MEAS - ESV(MOD-SP2): 12.5 ML
BH CV ECHO MEAS - ESV(MOD-SP4): 18.1 ML
BH CV ECHO MEAS - FS: 45 %
BH CV ECHO MEAS - IVS/LVPW: 0.94 CM
BH CV ECHO MEAS - IVSD: 1.25 CM
BH CV ECHO MEAS - LA DIMENSION: 3.2 CM
BH CV ECHO MEAS - LAT PEAK E' VEL: 5.9 CM/SEC
BH CV ECHO MEAS - LV DIASTOLIC VOL/BSA (35-75): 14.3 CM2
BH CV ECHO MEAS - LV MASS(C)D: 177.2 GRAMS
BH CV ECHO MEAS - LV SYSTOLIC VOL/BSA (12-30): 9.4 CM2
BH CV ECHO MEAS - LVIDD: 3.9 CM
BH CV ECHO MEAS - LVIDS: 2.14 CM
BH CV ECHO MEAS - LVPWD: 1.33 CM
BH CV ECHO MEAS - MED PEAK E' VEL: 3.8 CM/SEC
BH CV ECHO MEAS - MV A MAX VEL: 69.4 CM/SEC
BH CV ECHO MEAS - MV DEC SLOPE: 199.9 CM/SEC2
BH CV ECHO MEAS - MV DEC TIME: 0.15 MSEC
BH CV ECHO MEAS - MV E MAX VEL: 29.6 CM/SEC
BH CV ECHO MEAS - MV E/A: 0.43
BH CV ECHO MEAS - RVDD: 2.7 CM
BH CV ECHO MEAS - SI(MOD-SP2): 15.9 ML/M2
BH CV ECHO MEAS - SI(MOD-SP4): 5 ML/M2
BH CV ECHO MEAS - SV(MOD-SP2): 30.7 ML
BH CV ECHO MEAS - SV(MOD-SP4): 9.6 ML
BH CV ECHO MEASUREMENTS AVERAGE E/E' RATIO: 6.1
BH CV XLRA MEAS LEFT CAROTID BULB EDV: 11.8 CM/SEC
BH CV XLRA MEAS LEFT CAROTID BULB PSV: 49.7 CM/SEC
BH CV XLRA MEAS LEFT DIST CCA EDV: 16.8 CM/SEC
BH CV XLRA MEAS LEFT DIST CCA PSV: 82.6 CM/SEC
BH CV XLRA MEAS LEFT DIST ICA EDV: -28 CM/SEC
BH CV XLRA MEAS LEFT DIST ICA PSV: -87.6 CM/SEC
BH CV XLRA MEAS LEFT ICA/CCA RATIO: 0.84
BH CV XLRA MEAS LEFT MID ICA EDV: -24.2 CM/SEC
BH CV XLRA MEAS LEFT MID ICA PSV: -89.5 CM/SEC
BH CV XLRA MEAS LEFT PROX CCA EDV: 18 CM/SEC
BH CV XLRA MEAS LEFT PROX CCA PSV: 114.3 CM/SEC
BH CV XLRA MEAS LEFT PROX ECA EDV: -10.6 CM/SEC
BH CV XLRA MEAS LEFT PROX ECA PSV: -88.2 CM/SEC
BH CV XLRA MEAS LEFT PROX ICA EDV: -21.1 CM/SEC
BH CV XLRA MEAS LEFT PROX ICA PSV: -69.6 CM/SEC
BH CV XLRA MEAS LEFT VERTEBRAL A EDV: -6.8 CM/SEC
BH CV XLRA MEAS LEFT VERTEBRAL A PSV: -50.9 CM/SEC
BH CV XLRA MEAS RIGHT CAROTID BULB EDV: 13 CM/SEC
BH CV XLRA MEAS RIGHT CAROTID BULB PSV: 46 CM/SEC
BH CV XLRA MEAS RIGHT DIST CCA EDV: 13.7 CM/SEC
BH CV XLRA MEAS RIGHT DIST CCA PSV: 54.7 CM/SEC
BH CV XLRA MEAS RIGHT DIST ICA EDV: -22.4 CM/SEC
BH CV XLRA MEAS RIGHT DIST ICA PSV: -70.8 CM/SEC
BH CV XLRA MEAS RIGHT ICA/CCA RATIO: 1.08
BH CV XLRA MEAS RIGHT MID ICA EDV: -18 CM/SEC
BH CV XLRA MEAS RIGHT MID ICA PSV: -51.6 CM/SEC
BH CV XLRA MEAS RIGHT PROX CCA EDV: 15.5 CM/SEC
BH CV XLRA MEAS RIGHT PROX CCA PSV: 69.6 CM/SEC
BH CV XLRA MEAS RIGHT PROX ECA EDV: -11.8 CM/SEC
BH CV XLRA MEAS RIGHT PROX ECA PSV: -108.1 CM/SEC
BH CV XLRA MEAS RIGHT PROX ICA EDV: -19.3 CM/SEC
BH CV XLRA MEAS RIGHT PROX ICA PSV: -59 CM/SEC
BH CV XLRA MEAS RIGHT VERTEBRAL A EDV: -13.7 CM/SEC
BH CV XLRA MEAS RIGHT VERTEBRAL A PSV: -36.7 CM/SEC
BILIRUB SERPL-MCNC: 0.7 MG/DL (ref 0–1.2)
BUN SERPL-MCNC: 15 MG/DL (ref 8–23)
BUN/CREAT SERPL: 15.8 (ref 7–25)
CALCIUM SPEC-SCNC: 9.6 MG/DL (ref 8.6–10.5)
CHLORIDE SERPL-SCNC: 98 MMOL/L (ref 98–107)
CHOLEST SERPL-MCNC: 246 MG/DL (ref 0–200)
CO2 SERPL-SCNC: 22.6 MMOL/L (ref 22–29)
CREAT SERPL-MCNC: 0.95 MG/DL (ref 0.76–1.27)
EGFRCR SERPLBLD CKD-EPI 2021: 89.9 ML/MIN/1.73
GLOBULIN UR ELPH-MCNC: 3 GM/DL
GLUCOSE BLDC GLUCOMTR-MCNC: 198 MG/DL (ref 70–99)
GLUCOSE BLDC GLUCOMTR-MCNC: 231 MG/DL (ref 70–99)
GLUCOSE BLDC GLUCOMTR-MCNC: 237 MG/DL (ref 70–99)
GLUCOSE BLDC GLUCOMTR-MCNC: 276 MG/DL (ref 70–99)
GLUCOSE SERPL-MCNC: 257 MG/DL (ref 65–99)
HBA1C MFR BLD: 13.6 % (ref 4.8–5.6)
HDLC SERPL-MCNC: 46 MG/DL (ref 40–60)
LDLC SERPL CALC-MCNC: 154 MG/DL (ref 0–100)
LDLC/HDLC SERPL: 3.26 {RATIO}
LEFT ARM BP: NORMAL MMHG
LEFT ATRIUM VOLUME INDEX: 19.8 ML/M2
MAGNESIUM SERPL-MCNC: 2 MG/DL (ref 1.6–2.4)
POTASSIUM SERPL-SCNC: 3 MMOL/L (ref 3.5–5.2)
PROT SERPL-MCNC: 7 G/DL (ref 6–8.5)
RIGHT ARM BP: NORMAL MMHG
SODIUM SERPL-SCNC: 134 MMOL/L (ref 136–145)
TRIGL SERPL-MCNC: 251 MG/DL (ref 0–150)
TSH SERPL DL<=0.05 MIU/L-ACNC: 1.63 UIU/ML (ref 0.27–4.2)
VLDLC SERPL-MCNC: 46 MG/DL (ref 5–40)

## 2023-08-09 PROCEDURE — 97161 PT EVAL LOW COMPLEX 20 MIN: CPT

## 2023-08-09 PROCEDURE — 80053 COMPREHEN METABOLIC PANEL: CPT | Performed by: PHYSICIAN ASSISTANT

## 2023-08-09 PROCEDURE — 99232 SBSQ HOSP IP/OBS MODERATE 35: CPT | Performed by: STUDENT IN AN ORGANIZED HEALTH CARE EDUCATION/TRAINING PROGRAM

## 2023-08-09 PROCEDURE — 63710000001 INSULIN DETEMIR PER 5 UNITS: Performed by: INTERNAL MEDICINE

## 2023-08-09 PROCEDURE — 99233 SBSQ HOSP IP/OBS HIGH 50: CPT | Performed by: INTERNAL MEDICINE

## 2023-08-09 PROCEDURE — 82948 REAGENT STRIP/BLOOD GLUCOSE: CPT

## 2023-08-09 PROCEDURE — 83735 ASSAY OF MAGNESIUM: CPT | Performed by: PHYSICIAN ASSISTANT

## 2023-08-09 PROCEDURE — 93306 TTE W/DOPPLER COMPLETE: CPT

## 2023-08-09 PROCEDURE — 63710000001 INSULIN REGULAR HUMAN PER 5 UNITS: Performed by: INTERNAL MEDICINE

## 2023-08-09 PROCEDURE — 74230 X-RAY XM SWLNG FUNCJ C+: CPT

## 2023-08-09 PROCEDURE — 93306 TTE W/DOPPLER COMPLETE: CPT | Performed by: SPECIALIST

## 2023-08-09 PROCEDURE — 92610 EVALUATE SWALLOWING FUNCTION: CPT

## 2023-08-09 PROCEDURE — 25010000002 ENOXAPARIN PER 10 MG: Performed by: INTERNAL MEDICINE

## 2023-08-09 PROCEDURE — 80061 LIPID PANEL: CPT | Performed by: INTERNAL MEDICINE

## 2023-08-09 PROCEDURE — 84443 ASSAY THYROID STIM HORMONE: CPT | Performed by: INTERNAL MEDICINE

## 2023-08-09 PROCEDURE — 97165 OT EVAL LOW COMPLEX 30 MIN: CPT

## 2023-08-09 PROCEDURE — 92611 MOTION FLUOROSCOPY/SWALLOW: CPT

## 2023-08-09 RX ORDER — HYDROCHLOROTHIAZIDE 25 MG/1
25 TABLET ORAL DAILY
Status: DISCONTINUED | OUTPATIENT
Start: 2023-08-09 | End: 2023-08-14 | Stop reason: HOSPADM

## 2023-08-09 RX ORDER — LISINOPRIL 10 MG/1
10 TABLET ORAL
Status: DISCONTINUED | OUTPATIENT
Start: 2023-08-09 | End: 2023-08-09

## 2023-08-09 RX ORDER — POTASSIUM CHLORIDE 750 MG/1
40 CAPSULE, EXTENDED RELEASE ORAL ONCE
Status: COMPLETED | OUTPATIENT
Start: 2023-08-09 | End: 2023-08-09

## 2023-08-09 RX ORDER — MELATONIN
1000 DAILY
Status: DISCONTINUED | OUTPATIENT
Start: 2023-08-09 | End: 2023-08-14 | Stop reason: HOSPADM

## 2023-08-09 RX ADMIN — ENOXAPARIN SODIUM 40 MG: 100 INJECTION SUBCUTANEOUS at 09:06

## 2023-08-09 RX ADMIN — BARIUM SULFATE 1 TEASPOON(S): 0.6 CREAM ORAL at 13:15

## 2023-08-09 RX ADMIN — Medication 10 ML: at 20:51

## 2023-08-09 RX ADMIN — INSULIN HUMAN 4 UNITS: 100 INJECTION, SOLUTION PARENTERAL at 06:31

## 2023-08-09 RX ADMIN — Medication 1000 UNITS: at 18:05

## 2023-08-09 RX ADMIN — Medication 10 ML: at 09:06

## 2023-08-09 RX ADMIN — INSULIN HUMAN 4 UNITS: 100 INJECTION, SOLUTION PARENTERAL at 14:07

## 2023-08-09 RX ADMIN — BARIUM SULFATE 50 ML: 400 SUSPENSION ORAL at 13:15

## 2023-08-09 RX ADMIN — POTASSIUM CHLORIDE 40 MEQ: 10 CAPSULE, COATED, EXTENDED RELEASE ORAL at 18:06

## 2023-08-09 RX ADMIN — ATORVASTATIN CALCIUM 80 MG: 40 TABLET, FILM COATED ORAL at 20:51

## 2023-08-09 RX ADMIN — HYDROCHLOROTHIAZIDE 25 MG: 25 TABLET ORAL at 18:15

## 2023-08-09 RX ADMIN — ASPIRIN 81 MG: 81 TABLET, CHEWABLE ORAL at 09:06

## 2023-08-09 RX ADMIN — BARIUM SULFATE 55 ML: 0.81 POWDER, FOR SUSPENSION ORAL at 13:15

## 2023-08-09 RX ADMIN — CLOPIDOGREL BISULFATE 75 MG: 75 TABLET ORAL at 09:06

## 2023-08-09 RX ADMIN — INSULIN HUMAN 2 UNITS: 100 INJECTION, SOLUTION PARENTERAL at 18:06

## 2023-08-09 RX ADMIN — INSULIN DETEMIR 10 UNITS: 100 INJECTION, SOLUTION SUBCUTANEOUS at 20:59

## 2023-08-09 RX ADMIN — INSULIN HUMAN 2 UNITS: 100 INJECTION, SOLUTION PARENTERAL at 00:03

## 2023-08-09 NOTE — PROGRESS NOTES
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 Workup so far has revealed subacute infarction in the right kenia which correlates with the patient's symptoms. The infarction is thrombotic likely due to multiple uncontrol vascular risk factors including hypertension, hyperlipidemia, severe uncontrolled diabetes. In addition, the patient's family reported severe snoring he likely has obstructive sleep apnea which is another risk factor for CVA.    Our recommendations are outlined below    Diagnostic Studies :  Carotid ultrasound did not show any significant carotid stenosis Overall the results of echocardiogram already ordered    Antithrombotic Medications :  Initiate dual antiplatelet therapy with Aspirin 81 mg daily and Clopidogrel 75 mg daily  Already Ordered  Statins for LDL goal less than 70  Already Ordered    Nursing recommendations :  Continue with Telemetry    Consultations :  Recommend Speech therapy if failed dysphagia screenPhysical therapy/Occupational therapyInpatient rehab if recommended by physical/occupational therapy    DVT Prophylaxis :  Choice of Primary Team    Miscellaneous :  Outpatient Neurology or Pulmonology follow up for evaluation and management of obstructive sleep apnea    Dispositions :  Neurology will follow    Subjective  Patient continued to have dysphagia and difficulty coughing. Reviewed history with patient's daughter the patient does not visit physicians. He has a history of severe snoring with difficulty waking in the mornings.    Imaging  MRI brain, independently reviewed, demonstrated acute right kenia  infarction. Angiography did not demonstrate any significant intracranial stenosis    Labs  , A1c 13.6       Examination  BP(160/82), Pulse(74), Temp(97.3),    Neuro Exam:  General: Alert,Awake, Oriented to Time, Place, Person    Speech: Dysarthric:    Language: Intact:    Face: Facial Droop: Left    Facial Sensation: Intact:    Visual Fields: Intact:    Extraocular Movements: Intact:    Motor Exam: No Drift:    Sensation: Intact:    Coordination: Intact:           Patient/Family was informed the Neurology Consult would happen via TeleHealth consult by way of interactive audio and video telecommunications and consented to receiving care in this manner.    Telehealth Neurology consultation was provided. I spent 20 minutes providing telehealth care. This includes time spent for face to face visit via telemedicine, review of medical records, imaging studies and discussion of findings with providers, the patient and/or family.      Dr Mark Loving      [facility]  For Inpatient follow-up with [facility] physician please call [facility] [phone]. This is not an outpatient service. Post hospital discharge, please contact hospital directly.

## 2023-08-09 NOTE — THERAPY EVALUATION
Patient Name: Italo Pina  : 1960    MRN: [id number]                              Today's Date: 2023       Admit Date: 2023    Visit Dx:     ICD-10-CM ICD-9-CM   1. Cerebrovascular accident (CVA), unspecified mechanism  I63.9 434.91   2. Hyperglycemia  R73.9 790.29   3. Hypertension, unspecified type  I10 401.9   4. Difficulty in walking  R26.2 719.7   5. Dysphagia, oropharyngeal  R13.12 787.22   6. Decreased activities of daily living (ADL)  Z78.9 V49.89     Patient Active Problem List   Diagnosis    CVA (cerebral vascular accident)     Past Medical History:   Diagnosis Date    Hypertension      Past Surgical History:   Procedure Laterality Date    KNEE CARTILAGE SURGERY Right       General Information       Row Name 23 1426          OT Time and Intention    Document Type evaluation  -ES     Mode of Treatment individual therapy;occupational therapy  -ES       Row Name 23 1426          General Information    Patient Profile Reviewed yes  -ES     Prior Level of Function independent:;ADL's;all household mobility;community mobility;work  Patient independent with B and IADLs at baseline. Patient is employed at nail salon. No device for functional mobility at baseline. Standing shower completion. [place] in stance. No home O2 use. Denies recent falls.  -ES     Barriers to Rehab none identified  -ES       Row Name 23 1426          Occupational Profile    Reason for Services/Referral (Occupational Profile) Patient is 63 yr old male admitted to [facility] on 2023 with reports of slurred speech, right facial droop, left sided weakness. OT evaluation and treatment ordered d/t recent decline in ADLs/transfer ability and discharge planning recommendations. No previous OT services for current condition.  -ES       Row Name 23 1426          Living Environment    People in Home spouse  -ES       Row Name 23 1426          Home Main Entrance    Number of Stairs, Main  Entrance two  -ES       Row Name 08/09/23 1426          Stairs Within Home, Primary    Stairs, Within Home, Primary flight of stairs to second floor where master bedroom is located  -ES       Row Name 08/09/23 1426          Cognition    Orientation Status (Cognition) oriented x 3  Patient pleasant and cooperative, agreeable to therapy evaluation. Family present in room, involved in patient care.  -ES       Row Name 08/09/23 1426          Safety Issues, Functional Mobility    Impairments Affecting Function (Mobility) balance;pain;strength;postural/trunk control  -ES               User Key  (r) = Recorded By, (t) = Taken By, (c) = Cosigned By      Initials Name Provider Type    ES Amanda Beaulieu, OTR/L, CSRS Occupational Therapist                     Mobility/ADL's       Row Name 08/09/23 1434          Bed Mobility    Bed Mobility supine-sit;sit-supine  -ES     Supine-Sit [place] (Bed Mobility) contact guard;1 person assist  -ES     Sit-Supine [place] (Bed Mobility) contact guard;1 person assist  -ES     Bed Mobility, Safety Issues decreased use of arms for pushing/pulling  -ES     Comment, (Bed Mobility) patient CGA seated edge of bed. Presents wwith posterior lean with dynamic task engagement.  -ES       Row Name 08/09/23 1434          Transfers    Transfers sit-stand transfer;stand-sit transfer  -ES       Row Name 08/09/23 1434          Sit-Stand Transfer    Sit-Stand [place] (Transfers) minimum assist (75% patient effort);1 person assist  -ES       Row Name 08/09/23 1434          Stand-Sit Transfer    Stand-Sit [place] (Transfers) minimum assist (75% patient effort);1 person assist  -ES       Row Name 08/09/23 1434          Functional Mobility    Functional Mobility- Ind. Level not tested  -ES       Row Name 08/09/23 1434          Activities of Daily Living    BADL Assessment/Intervention bathing;upper body dressing;lower body dressing;grooming;feeding;toileting  -       Row Name 08/09/23 1434           Bathing Assessment/Intervention    Page Level (Bathing) bathing skills;minimum assist (75% patient effort)  -ES       Row Name 08/09/23 1434          Upper Body Dressing Assessment/Training    Page Level (Upper Body Dressing) upper body dressing skills;minimum assist (75% patient effort)  -ES       Row Name 08/09/23 1434          Lower Body Dressing Assessment/Training    Page Level (Lower Body Dressing) lower body dressing skills;moderate assist (50% patient effort)  -ES       Row Name 08/09/23 1434          Grooming Assessment/Training    Page Level (Grooming) grooming skills;minimum assist (75% patient effort)  -ES       Row Name 08/09/23 1434          Self-Feeding Assessment/Training    Page Level (Feeding) feeding skills;minimum assist (75% patient effort)  -ES       Row Name 08/09/23 1434          Toileting Assessment/Training    Page Level (Toileting) toileting skills;moderate assist (50% patient effort)  -ES               User Key  (r) = Recorded By, (t) = Taken By, (c) = Cosigned By      Initials Name Provider Type    ES Amanda Beaulieu, OTR/L, CSRS Occupational Therapist                   Obj/Interventions       [facility] Name 08/09/23 1436          Sensory Assessment (Somatosensory)    Sensory Assessment (Somatosensory) sensation intact  -     Sensory Assessment bilateral upper extremity sensation intact to light and heavy touch. Patient denies sensory discrimination deficits at time of assessment  -[facility] Name 08/09/23 1436          Vision Assessment/Intervention    Visual Impairment/Limitations L  -     Vision Assessment Comment patient denies blurry vision, spots, or diplopia. Quick vision screen WNL.  -       Row Name 08/09/23 1436          Range of Motion Comprehensive    Comment, General Range of Motion AROM RUE WFL. PROM LUE WFL  -[facility] Name 08/09/23 1436          Strength Comprehensive (MMT)    General Manual Muscle Testing  (MMT) Assessment upper extremity strength deficits identified;lower extremity strength deficits identified  -ES     Comment, General Manual Muscle Testing (MMT) Assessment RUE assessed 4/5. LUE assessed grossly 2-/5.  -ES       Row Name 08/09/23 1436          Motor Skills    Motor Skills coordination;functional endurance  -ES     Coordination moderate impairment;upper extremity;lower extremity  -ES     Functional Endurance fair.  -ES       Row Name 08/09/23 1436          Balance    Balance Assessment sitting dynamic balance;standing dynamic balance  -ES     Dynamic Sitting Balance contact guard  -ES     Position, Sitting Balance sitting edge of bed  -ES     Dynamic Standing Balance minimal assist;1-person assist  -ES     Comment, Balance patient presents with posterior lean seated edge of bed with dynamic balance engagement. Patient requires verbal and tactile cueing to correct  -ES               User Key  (r) = Recorded By, (t) = Taken By, (c) = Cosigned By      Initials Name Provider Type    Amanda Yarbrough, OTR/L, CSRS Occupational Therapist                   Goals/Plan       Row Name 08/09/23 1441          Transfer Goal 1 (OT)    Activity/Assistive Device (Transfer Goal 1, OT) transfers, all  -ES     [place] Level/Cues Needed (Transfer Goal 1, OT) standby assist  -ES     Time Frame (Transfer Goal 1, OT) long term goal (LTG);10 days  -ES       Row Name 08/09/23 1441          Bathing Goal 1 (OT)    Activity/Device (Bathing Goal 1, OT) bathing skills, all  -ES     [place] Level/Cues Needed (Bathing Goal 1, OT) standby assist  -ES     Time Frame (Bathing Goal 1, OT) long term goal (LTG);10 days  -ES       Row Name 08/09/23 1441          Dressing Goal 1 (OT)    Activity/Device (Dressing Goal 1, OT) dressing skills, all  -ES     [place]/Cues Needed (Dressing Goal 1, OT) standby assist  -ES     Time Frame (Dressing Goal 1, OT) long term goal (LTG);10 days  -ES       Row Name 08/09/23 1441           Toileting Goal 1 (OT)    Activity/Device (Toileting Goal 1, OT) toileting skills, all  -ES     [place] Level/Cues Needed (Toileting Goal 1, OT) minimum assist (75% or more patient effort)  -ES     Time Frame (Toileting Goal 1, OT) long term goal (LTG);10 days  -ES       Row Name 08/09/23 1441          Grooming Goal 1 (OT)    Activity/Device (Grooming Goal 1, OT) grooming skills, all  -ES     [place] (Grooming Goal 1, OT) standby assist;set-up required  -ES     Time Frame (Grooming Goal 1, OT) long term goal (LTG);10 days  -ES       Row Name 08/09/23 1441          Strength Goal 1 (OT)    Strength Goal 1 (OT) Pt will increase LUE strength to 3+/5 muscle grade for increased UE strength required for ADL transfers and task completion  -ES     Time Frame (Strength Goal 1, OT) long term goal (LTG);10 days  -ES       Row Name 08/09/23 1441          Problem Specific Goal 1 (OT)    Problem Specific Goal 1 (OT) Patient will demonstrate fair plus graded dynamic balance in preperation for independent transfers and ADL routine completion  -ES     Time Frame (Problem Specific Goal 1, OT) long term goal (LTG);10 days  -ES       Row Name 08/09/23 1441          Therapy Assessment/Plan (OT)    Planned Therapy Interventions (OT) activity tolerance training;BADL retraining;functional balance retraining;occupation/activity based interventions;patient/caregiver education/training;strengthening exercise;transfer/mobility retraining;neuromuscular control/coordination retraining;cognitive/visual perception retraining  -ES               User Key  (r) = Recorded By, (t) = Taken By, (c) = Cosigned By      Initials Name Provider Type    ES Amanda Beaulieu, OTPHILLIP/L, CSRS Occupational Therapist                   Clinical Impression       Row Name 08/09/23 1439          Plan of Care Review    Plan of Care Reviewed With patient;family  -ES     Progress no change  -ES     Outcome Evaluation Patient has experienced decline in function from  baseline status, presenting w/ deficits related to strength, coordination, balance, transfers and mobility that impede patient independence with activities of daily living.  Patient would benefit from skilled Occupational Therapy intervention to maxamize patient safety, and promote return to baseline independence.  -ES       Row Name 08/09/23 1438          Therapy Assessment/Plan (OT)    Rehab Potential (OT) good, to achieve stated therapy goals  -ES     Criteria for Skilled Therapeutic Interventions Met (OT) yes;meets criteria;skilled treatment is necessary  -ES       Row Name 08/09/23 1438          Therapy Plan Review/Discharge Plan (OT)    Anticipated Discharge Disposition (OT) inpatient rehabilitation facility  -ES               User Key  (r) = Recorded By, (t) = Taken By, (c) = Cosigned By      Initials Name Provider Type    ES Amanda Beaulieu, OTR/L, CSRS Occupational Therapist                   Outcome Measures       Row Name 08/09/23 1442          How much help from another is currently needed...    Putting on and taking off regular lower body clothing? 2  -ES     Bathing (including washing, rinsing, and drying) 2  -ES     Toileting (which includes using toilet bed pan or urinal) 2  -ES     Putting on and taking off regular upper body clothing 3  -ES     Taking care of personal grooming (such as brushing teeth) 3  -ES     Eating meals 3  -ES     AM-PAC 6 Clicks Score (OT) 15  -ES       Row Name 08/09/23 0900 08/09/23 0712       How much help from another person do you currently need...    Turning from your back to your side while in flat bed without using bedrails? 4  -RADHA 4  -SJ    Moving from lying on back to sitting on the side of a flat bed without bedrails? 3  -RADHA 4  -SJ    Moving to and from a bed to a chair (including a wheelchair)? 3  -RADHA 4  -SJ    Standing up from a chair using your arms (e.g., wheelchair, bedside chair)? 3  -RADHA 4  -SJ    Climbing 3-5 steps with a railing? 2  -RADHA 4  -SJ    To walk in  hospital room? 3  -RADHA 4  -SJ    AM-PAC 6 Clicks Score (PT) 18  -RADHA 24  -SJ    Highest level of mobility 6 --> Walked 10 steps or more  -RADHA 8 --> Walked 250 feet or more  -SJ      Row Name 08/09/23 1442 08/09/23 0900       Functional Assessment    Outcome Measure Options AM-PAC 6 Clicks Daily Activity (OT);Optimal Instrument  -ES AM-PAC 6 Clicks Basic Mobility (PT)  -RADHA      Row Name 08/09/23 1442          Optimal Instrument    Optimal Instrument Optimal - 3  -ES     Bending/Stooping 3  -ES     Standing 3  -ES     Reaching 3  -ES     From the list, choose the 3 activities you would most like to be able to do without any difficulty Bending/stooping;Standing;Reaching  -ES     Total Score Optimal - 3 9  -ES               User Key  (r) = Recorded By, (t) = Taken By, (c) = Cosigned By      Initials Name Provider Type    Steve Vieira, PT Physical Therapist    Carie Godwin RN Registered Nurse    Amanda Yarbrough, OTR/L, CSRS Occupational Therapist                      OT Recommendation and Plan  Planned Therapy Interventions (OT): activity tolerance training, BADL retraining, functional balance retraining, occupation/activity based interventions, patient/caregiver education/training, strengthening exercise, transfer/mobility retraining, neuromuscular control/coordination retraining, cognitive/visual perception retraining  Plan of Care Review  Plan of Care Reviewed With: patient, family  Progress: no change  Outcome Evaluation: Patient has experienced decline in function from baseline status, presenting w/ deficits related to strength, coordination, balance, transfers and mobility that impede patient independence with activities of daily living.  Patient would benefit from skilled Occupational Therapy intervention to maxamize patient safety, and promote return to baseline independence.     Time Calculation:   Evaluation Complexity (OT)  Review Occupational Profile/Medical/Therapy History Complexity: brief/low  complexity  Assessment, Occupational Performance/Identification of Deficit Complexity: 3-5 performance deficits  Clinical Decision Making Complexity (OT): problem focused assessment/low complexity  Overall Complexity of Evaluation (OT): low complexity     Time Calculation- OT       Row Name 08/09/23 1443             Time Calculation- OT    OT Received On 08/09/23  -ES      OT Goal Re-Cert Due Date 08/18/23  -ES         Untimed Charges    OT Eval/Re-eval Minutes 48  -ES         Total Minutes    Untimed Charges Total Minutes 48  -ES       Total Minutes 48  -ES                User Key  (r) = Recorded By, (t) = Taken By, (c) = Cosigned By      Initials Name Provider Type    ES Amanda Beaulieu, OTR/L, CSRS Occupational Therapist                  Therapy Charges for Today       Code Description Service Date Service Provider Modifiers Qty    [id number] HC OT EVAL LOW COMPLEXITY 4 8/9/2023 Amanda Beaulieu, OTR/L, CSRS GO 1                 Amanda Beaulieu OTR/L, CSRS  8/9/2023

## 2023-08-09 NOTE — PLAN OF CARE
Goal Outcome Evaluation:  Plan of Care Reviewed With: spouse, patient        Progress: no change          VSS. Pt has rested this shift. No complaints of pain. Will continue to monitor

## 2023-08-09 NOTE — CASE MANAGEMENT/SOCIAL WORK
Discharge Planning Assessment  JUDY Herron     Patient Name: Italo Pina  MRN: [id number]  Today's Date: 8/9/2023    Admit Date: 8/8/2023    Plan: patient presents with a cva. spouse and daughter at bedside. 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 patient does not have a pcp and is medicaid pending at this time. daughter/spouse requesting [facility] referral to be maid, referral sent. patient may need a walker at discharge and also pcp maid. patient medicaid pending during this admission. cm will follow   Discharge Needs Assessment       Row Name 08/09/23 1405       Living Environment    People in Home spouse    Current Living Arrangements home    Potentially Unsafe Housing Conditions none    Primary Care Provided by self;child(silvino);spouse/significant other    Family Caregiver if Needed spouse;child(silvino), adult    Quality of Family Relationships helpful;involved;supportive    Able to Return to Prior Arrangements yes       Resource/Environmental Concerns    Resource/Environmental Concerns none    Transportation Concerns none       Food Insecurity    Within the past 12 months, you worried that your food would run out before you got the money to buy more. Never true    Within the past 12 months, the food you bought just didn't last and you didn't have money to get more. Never true       Transition Planning    Patient/Family Anticipates Transition to home with family;home with help/services;inpatient rehabilitation facility    Patient/Family Anticipated Services at Transition durable medical equipment;home health care;rehabilitation services    Transportation Anticipated family or friend will provide       Discharge Needs Assessment    Readmission Within the Last 30 Days no previous admission in last 30 days    Equipment Currently Used at Home none    Concerns to be Addressed discharge planning    Anticipated Changes Related to  Illness none    Equipment Needed After Discharge walker, rolling    Discharge Facility/Level of Care Needs home with home health;rehabilitation facility;acute rehab                   Discharge Plan       Row Name 08/09/23 1406       Plan    Plan patient presents with a cva. spouse and daughter at bedside. 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 patient does not have a pcp and is medicaid pending at this time. daughter/spouse requesting [facility] referral to be maid, referral sent. patient may need a walker at discharge and also pcp maid. patient medicaid pending during this admission. cm will follow    Patient/Family in Agreement with Plan unable to assess                  Continued Care and Services - Admitted Since 8/8/2023       Destination       Service Provider Request Status Selected Services Address Phone Fax Patient Preferred    [facility] Pending - Request Sent N/A [address] [id number] [phone] --                     Demographic Summary       Row Name 08/09/23 1401       General Information    Admission Type inpatient    Arrived From home;emergency department    Referral Source admission list    Reason for Consult discharge planning    Preferred Language English       Contact Information    Permission Granted to Share Info With family/designee    Contact Information Obtained for                    Functional Status       Row Name 08/09/23 1409       Functional Status    Usual Activity Tolerance good    Current Activity Tolerance fair       Assessment of Health Literacy    How often do you have someone help you read hospital materials? Occasionally    How often do you have problems learning about your medical condition because of difficulty understanding written information? Never    How often do you have a problem understanding  what is told to you about your medical condition? Never    How confident are you filling out medical forms by yourself? 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                  Abuse/Neglect    No documentation.                  Legal    No documentation.                  Substance Abuse    No documentation.                  Patient Forms    No documentation.                     Keiry Vanegas RN

## 2023-08-09 NOTE — THERAPY EVALUATION
Acute Care - Speech Language Pathology   Swallow Initial Evaluation JUDY Herron     Patient Name: Italo Pina  : 1960  MRN: [id number]  Today's Date: 2023               Admit Date: 2023    Visit Dx:     ICD-10-CM ICD-9-CM   1. Cerebrovascular accident (CVA), unspecified mechanism  I63.9 434.91   2. Hyperglycemia  R73.9 790.29   3. Hypertension, unspecified type  I10 401.9   4. Difficulty in walking  R26.2 719.7   5. Dysphagia, oropharyngeal  R13.12 787.22     Patient Active Problem List   Diagnosis    CVA (cerebral vascular accident)     Past Medical History:   Diagnosis Date    Hypertension      Past Surgical History:   Procedure Laterality Date    KNEE CARTILAGE SURGERY Right      PAIN SCALE: None noted    PRECAUTIONS/CONTRAINDICATIONS:  None noted    SUSPECTED ABUSE/NEGLECT/EXPLOITATION:  None noted    SOCIAL/PSYCHOLOGICAL NEEDS/BARRIERS: None noted    PAST SOCIAL HISTORY: Lives at home    PRIOR FUNCTION: Independent    PATIENT GOALS/EXPECTATIONS: Did not report    HISTORY: This patient is a 63-year-old male admitted with the above diagnosis.  MRI was positive for pontine infarct.    CURRENT DIET LEVEL: N.p.o.    OBJECTIVE:    TEST ADMINISTERED: Clinical dysphagia evaluation    COGNITION/SAFETY AWARENESS: Alert    BEHAVIORAL OBSERVATIONS: Cooperative    ORAL MOTOR EXAM: Left facial droop.    VOICE QUALITY:   Mild hoarseness    REFLEX EXAM: Deferred    POSTURE: 90 degrees upright    FEEDING/SWALLOWING FUNCTION: Assessed with ice chips, thin liquids, honey thick liquids and pur‚e consistencies    CLINICAL OBSERVATIONS: Oral stage is characterized by mildly prolonged bolus preparation.  Some reduced bolus control.  Appears to have timely oral transit.  Pharyngeal phase timely but with reduced laryngeal elevation per palpation.  Clinical signs of aspiration noted with thin liquids and ice chips.  Appears to tolerate honey thick liquids and pur‚e consistencies without overt clinical signs of  aspiration.  Repeated swallows were noted with concern for pharyngeal residue    DYSPHAGIA CRITERIA: Risk of aspiration    FUNCTIONAL ASSESSMENT INSTRUMENT: Patient currently scored a level 4 of 7 on Functional Communication Measures for swallowing indicating a 40-59% limitation in function.    ASSESSMENT/ PLAN OF CARE:  Pt presents with limitations, noted below, that impede his ability to swallow safely. The skills of a therapist will be required to safely and effectively implement the following treatment plan to restore maximal level of function.     PROBLEMS:  1.  Dysphagia   LTG 1: (30 days) patient will increase ability to tolerate least restrictive diet and improve functional communication measure for swallowing to a 6 and 7   STG 1a: (14 days) patient will tolerate therapeutic trials of thin, nectar, honey thick liquids without clinical sign or symptom of aspiration with 8 of 10 trials.   STG 1b: (14 days) patient will tolerate pur‚ed and soft solid consistencies without clinical sign or symptom of aspiration with 8 of 10 trials.   STG 1c: (14 days) patient/family teaching regarding diet recommendations, safe swallow strategies and signs and symptoms of aspiration.   TREATMENT: Dysphagia therapy to ensure diet tolerance, advance to least restrictive diet and analyze for aspiration risk.    FREQUENCY/DURATION: Twice daily 5 times a week    REHAB POTENTIAL:  Pt has good rehab potential.  The following limitations may influence improvement/ length of tx medical status.    RECOMMENDATIONS:   1.   DIET: Hold on diet recommendations until modified barium swallow can be completed later today.    Pt/responsible party agrees with plan of care and has been informed of all alternatives, risks and benefits.    EDUCATION  The patient has been educated in the following areas:   Dysphagia (Swallowing Impairment).     Terra Hilario, SLP  8/9/2023

## 2023-08-09 NOTE — THERAPY EVALUATION
Acute Care - Physical Therapy Initial Evaluation  JUDY Herron     Patient Name: Italo Pina  : 1960  MRN: [id number]  Today's Date: 2023     Admit date: 2023     Referring Physician: Gaudencio Franz DO     Surgery Date:* No surgery found *             Visit Dx:     ICD-10-CM ICD-9-CM   1. Cerebrovascular accident (CVA), unspecified mechanism  I63.9 434.91   2. Hyperglycemia  R73.9 790.29   3. Hypertension, unspecified type  I10 401.9   4. Difficulty in walking  R26.2 719.7     Patient Active Problem List   Diagnosis    CVA (cerebral vascular accident)     Past Medical History:   Diagnosis Date    Hypertension      Past Surgical History:   Procedure Laterality Date    KNEE CARTILAGE SURGERY Right      PT Assessment (last 12 hours)       PT Evaluation and Treatment       Row Name 23          Physical Therapy Time and Intention    Subjective Information no complaints  -RADHA     Document Type evaluation  -RADHA     Mode of Treatment individual therapy;physical therapy  -RADHA     Patient Effort good  -RADHA       Row Name 23 09          General Information    Patient Observations alert;cooperative;agree to therapy  -RADHA     Prior Level of Function independent:;all household mobility;community mobility  -RADHA     Equipment Currently Used at Home none  -RADHA     Existing Precautions/Restrictions fall  -RADHA     Barriers to Rehab none identified  -RADHA       Row Name 23 09          Living Environment    Current Living Arrangements home  -RADHA       Row Name 23          Range of Motion (ROM)    Range of Motion ROM is WFL  -RADHA       Row Name 23 09          Strength (Manual Muscle Testing)    Strength (Manual Muscle Testing) left lower extremity;left upper extremity  4-/5 throughout.  Requiring extra time for proper positioning.  LUE also impaired at 2-/5 throughout  -RADHA       Row Name 23 09          Bed Mobility    Bed Mobility bed mobility (all) activities;supine-sit  -RADHA      All Activities, [place] (Bed Mobility) contact guard  -RADHA     Supine-Sit [place] (Bed Mobility) minimum assist (75% patient effort)  -RADHA       Row Name 08/09/23 0900          Transfers    Transfers bed-chair transfer;sit-stand transfer  -RADHA       Row Name 08/09/23 0900          Bed-Chair Transfer    Bed-Chair [place] (Transfers) minimum assist (75% patient effort)  -RADHA     Assistive Device (Bed-Chair Transfers) walker, front-wheeled  -RADHA       Row Name 08/09/23 0900          Sit-Stand Transfer    Sit-Stand [place] (Transfers) minimum assist (75% patient effort)  -RADHA     Assistive Device (Sit-Stand Transfers) walker, front-wheeled  -RADHA       Row Name 08/09/23 0900          Gait/Stairs (Locomotion)    Gait/Stairs Locomotion gait/ambulation assistive device  -RADHA     [place] Level (Gait) minimum assist (75% patient effort)  -RADHA     Assistive Device (Gait) walker, front-wheeled  -RADHA     Distance in Feet (Gait) 50  Pt demonstrated L sided lean in standing with L knee buckling with WB along with some L toe drag.  All able to correct with cueing.  -RADHA       Row Name 08/09/23 0900          Safety Issues, Functional Mobility    Impairments Affecting Function (Mobility) balance;pain;strength  -RADHA       Row Name 08/09/23 0900          Balance    Balance Assessment standing dynamic balance  -RADHA     Dynamic Standing Balance minimal assist  -RADHA       Row Name 08/09/23 0900          Plan of Care Review    Plan of Care Reviewed With patient  -RADHA     Outcome Evaluation Patient presents with decreased strength, transfers and ambulation.  Skilled physical therapy services will be required to address these mobility deficits.  -RADHA       Row Name 08/09/23 0900          Therapy Assessment/Plan (PT)    Rehab Potential (PT) good, to achieve stated therapy goals  -RADHA     Criteria for Skilled Interventions Met (PT) skilled treatment is necessary  -RADHA     Therapy Frequency (PT) daily  -RADHA     Predicted Duration of  Therapy Intervention (PT) 10 days  -RADHA     Problem List (PT) problems related to;balance;mobility;strength;motor control;postural control  -RADHA     Activity Limitations Related to Problem List (PT) unable to ambulate safely;unable to transfer safely  -RADHA       Row Name 08/09/23 0900          PT Evaluation Complexity    History, PT Evaluation Complexity no personal factors and/or comorbidities  -RADHA     Examination of Body Systems (PT Eval Complexity) total of 4 or more elements  -RADHA     Clinical Presentation (PT Evaluation Complexity) stable  -RADHA     Clinical Decision Making (PT Evaluation Complexity) low complexity  -RADHA     Overall Complexity (PT Evaluation Complexity) low complexity  -RADHA       Row Name 08/09/23 0900          Therapy Plan Review/Discharge Plan (PT)    Therapy Plan Review (PT) evaluation/treatment results reviewed;participants voiced agreement with care plan;participants included;patient  -RADHA       Row Name 08/09/23 0900          Physical Therapy Goals    Transfer Goal Selection (PT) transfer, PT goal 1  -RADHA     Gait Training Goal Selection (PT) gait training, PT goal 1  -RADHA       Row Name 08/09/23 0900          Transfer Goal 1 (PT)    Activity/Assistive Device (Transfer Goal 1, PT) transfers, all  -RADHA     [place] Level/Cues Needed (Transfer Goal 1, PT) independent  -RADHA     Time Frame (Transfer Goal 1, PT) long term goal (LTG);10 days  -RADHA       Row Name 08/09/23 0900          Gait Training Goal 1 (PT)    Activity/Assistive Device (Gait Training Goal 1, PT) gait (walking locomotion);assistive device use;walker, rolling  -RADHA     [place] Level (Gait Training Goal 1, PT) independent  -RADHA     Distance (Gait Training Goal 1, PT) 300  -RADHA     Time Frame (Gait Training Goal 1, PT) long term goal (LTG);10 days  -RADHA               User Key  (r) = Recorded By, (t) = Taken By, (c) = Cosigned By      Initials Name Provider Type    Steve Vieira, PT Physical Therapist                    Physical  Therapy Education       Title: PT OT SLP Therapies (Done)       Topic: Physical Therapy (Done)       Point: Mobility training (Done)       Learning Progress Summary             Patient Acceptance, E,TB, VU by RADHA at 8/9/2023 0949                         Point: Precautions (Done)       Learning Progress Summary             Patient Acceptance, E,TB, VU by RADHA at 8/9/2023 0949                                         User Key       Initials Effective Dates Name Provider Type Discipline    RADHA 06/03/21 -  Steve Woodard PT Physical Therapist PT                  PT Recommendation and Plan  Anticipated Discharge Disposition (PT): inpatient rehabilitation facility  Planned Therapy Interventions (PT): balance training, bed mobility training, gait training, home exercise program, stair training, transfer training, strengthening  Therapy Frequency (PT): daily  Plan of Care Reviewed With: patient  Outcome Evaluation: Patient presents with decreased strength, transfers and ambulation.  Skilled physical therapy services will be required to address these mobility deficits.   Outcome Measures       Row Name 08/09/23 0900             How much help from another person do you currently need...    Turning from your back to your side while in flat bed without using bedrails? 4  -RADHA      Moving from lying on back to sitting on the side of a flat bed without bedrails? 3  -RADHA      Moving to and from a bed to a chair (including a wheelchair)? 3  -RADHA      Standing up from a chair using your arms (e.g., wheelchair, bedside chair)? 3  -RADHA      Climbing 3-5 steps with a railing? 2  -RADHA      To walk in hospital room? 3  -RADHA      AM-PAC 6 Clicks Score (PT) 18  -RADHA         Functional Assessment    Outcome Measure Options AM-PAC 6 Clicks Basic Mobility (PT)  -RADHA                User Key  (r) = Recorded By, (t) = Taken By, (c) = Cosigned By      Initials Name Provider Type    Steve Vieira PT Physical Therapist                     Time  Calculation:    PT Charges       Row Name 08/09/23 0924             Time Calculation    PT Received On 08/09/23  -RADHA      PT Goal Re-Cert Due Date 08/18/23  -RADHA         Untimed Charges    PT Eval/Re-eval Minutes 31  -RADHA         Total Minutes    Untimed Charges Total Minutes 31  -RADHA       Total Minutes 31  -RADHA                User Key  (r) = Recorded By, (t) = Taken By, (c) = Cosigned By      Initials Name Provider Type    Steve Vieira, PT Physical Therapist                  Therapy Charges for Today       Code Description Service Date Service Provider Modifiers Qty    75328217139 HC PT EVAL LOW COMPLEXITY 3 8/9/2023 Steve Woodard, PT GP 1            PT G-Codes  Outcome Measure Options: AM-PAC 6 Clicks Basic Mobility (PT)  AM-PAC 6 Clicks Score (PT): 18    Steve Woodard PT  8/9/2023

## 2023-08-09 NOTE — MBS/VFSS/FEES
Acute Care - Speech Language Pathology   Swallow  Modified Barium Carey Herron     Patient Name: Italo Pina  : 1960  MRN: [id number]  Today's Date: 2023               Admit Date: 2023    Visit Dx:     ICD-10-CM ICD-9-CM   1. Cerebrovascular accident (CVA), unspecified mechanism  I63.9 434.91   2. Hyperglycemia  R73.9 790.29   3. Hypertension, unspecified type  I10 401.9   4. Difficulty in walking  R26.2 719.7   5. Dysphagia, oropharyngeal  R13.12 787.22     Patient Active Problem List   Diagnosis    CVA (cerebral vascular accident)     Past Medical History:   Diagnosis Date    Hypertension      Past Surgical History:   Procedure Laterality Date    KNEE CARTILAGE SURGERY Right      MODIFIED BARIUM SWALLOW STUDY: SPEECH PATHOLOGY REPORT    PERTINENT INFORMATION:  This patient is a 63year old male admitted with the above diagnosis..    Patient was referred for an MBSS by Dr. Mina to rule out aspiration as well as to determine appropriate treatment plan for this patient.      PROCEDURE:    Patient was alert and cooperative.  The patient was viewed in lateral projection.  The following Ba consistencies were administered: Thin liquids, nectar thick liquids, pur‚e consistencies and soft solids.  The following compensatory swallowing strategies were performed: Bolus as modification      RESULTS:    1.  Oral stage is characterized by reduced bolus preparation and control.  Premature spillage into the pharynx noted with liquid trials.  Delayed pharyngeal swallow. Minimal epiglottic deflection.  Reduced laryngeal elevation. Patient with silent aspiration with straw trials of nectar thick liquids.  Patient tolerated single sips from the cup with shallow transient penetration intermittently.  Tolerated spoonfed trials of nectar thick liquids with no penetration or aspiration.  Deep laryngeal penetration with spoonfed trials of thin liquids.  Tolerates pur‚e and soft solids without penetration or  aspiration.      IMPRESSIONS:    Patient demonstrated moderate to severe oropharyngeal dysphagia characterized by silent aspiration with large bolus volumes of nectar thick liquids.  Risk of aspiration with thin liquids with deep laryngeal penetration with spoon fed thin liquids.      FUNCTIONAL DEFICIT: Patient scored level 4 of 7 on Functional Communication Measures for swallowing indicating a 40-59% limitation in function for current status, goal status, and discharge status.      RECOMMENDATIONS:   1.  Mechanical soft diet, ground meats with sauce/gravy  2.  Spoonfed nectar thick liquids  3.  Slow rate, small bites/drinks  4.  Oral meds crushed in applesauce          Yes patient/responsible party agrees with the plan of care and has been informed of all alternatives, risks and benefits.    Thank you for this referral.    EDUCATION  The patient has been educated in the following areas:   Dysphagia (Swallowing Impairment).        Terra Hilario, SLP  8/9/2023

## 2023-08-09 NOTE — PLAN OF CARE
Goal Outcome Evaluation:  Plan of Care Reviewed With: patient           Outcome Evaluation: Patient presents with decreased strength, transfers and ambulation.  Skilled physical therapy services will be required to address these mobility deficits.      Anticipated Discharge Disposition (PT): inpatient rehabilitation facility

## 2023-08-09 NOTE — PROGRESS NOTES
[facility]   Hospitalist Progress Note  Date: 2023  Patient Name: Italo Pina  : 1960  MRN: [id number]  Date of admission: 2023      Subjective   Subjective     Chief Complaint: Follow up for slurred speech and trouble swallowing    Summary: 63-year-old male, no reported past medical history who presented with acute episode of trouble swallowing and slurred speech.  Did not meet criteria for thrombolytics.  Blood pressure very high on presentation.  Teleneurology consulted. CT head without contrast did not report acute infarct or hemorrhage.  CT cerebral perfusion study no core infarction noted.  CT angiogram head and neck no large vessel occlusion or hemodynamically significant stenoses but there was diffuse atherosclerotic changes and narrowing noted within the cavernous segments of the internal carotid arteries bilaterally.  Labs notable for glucose 425 and calcium 10.9.    Interval Followup:   No acute events overnight.  Blood pressure remains high.  On room air.  Denies worsening dysarthria or trouble swallowing.  Tolerated crushed pills in applesauce.  Was evaluated by PT, recommended inpatient rehab, he is agreeable.  Would like something to eat otherwise no acute complaints    Review of Systems  HEENT: + Trouble swallowing otherwise denied complaints  Cardiovascular:  No Chest Pain, No palpitations  Respiratory:  No Cough, No Dyspnea  Gastrointestinal:  No Nausea, No Vomiting  Neuro: No headache, +Weakness RUE, No parasthesias      Objective   Objective     Vitals:   Temp:  [97.3 øF (36.3 øC)-98.4 øF (36.9 øC)] 97.3 øF (36.3 øC)  Heart Rate:  [68-93] 74  Resp:  [16-18] 16  BP: (145-200)/() 160/82  Physical Exam    Constitutional: Awake, conversant, NAD   Eyes: Pupils equal and reactive, no conjunctival injections   HENT: NCAT, nares patent, MMM   Respiratory: Clear to auscultation bilaterally, nonlabored respirations    Cardiovascular: RRR, no murmurs, no  edema   Gastrointestinal: Positive bowel sounds, soft, nontender, nondistended   Neurologic: Alert, Cranial Nerves grossly intact, mild dysarthria, 4 out of 5 muscle strength in left upper extremity otherwise strength intact and symmetrical   Skin: Extremities warm, no rashes or wounds    Result Review    Result Review:  I have personally reviewed the following over the last 24 hours (07:00 to 07:00) and agree with the following findings  [x]  Laboratory  CBC          8/8/2023    12:33   CBC   WBC 6.34    RBC 4.77    Hemoglobin 14.7    Hematocrit 40.7    MCV 85.3    MCH 30.8    MCHC 36.1    RDW 12.7    Platelets 192      Lipid Panel          8/9/2023    04:27   Lipid Panel   Total Cholesterol 246    Triglycerides 251    HDL Cholesterol 46    VLDL Cholesterol 46    LDL Cholesterol  154    LDL/HDL Ratio 3.26      Hemoglobin A1c 13.60    Vitamin D OH 20.6    []  Microbiology  [x]  Radiology  [x]  EKG/Telemetry monitor personally reviewed: NSR, no events  [x]  Cardiology/Vascular   TTE  Left Ventricle Left ventricular systolic function is normal. Calculated left ventricular EF = 57.5%     Normal left ventricular cavity size noted. Left ventricular wall thickness is consistent with concentric hypertrophy. All left ventricular wall segments contract normally.   Right Ventricle Normal right ventricular cavity size, wall thickness, systolic function and septal motion noted.   Left Atrium Normal left atrial size and volume noted.   Right Atrium Normal right atrial cavity size noted.   Aortic Valve The aortic valve is structurally normal with no regurgitation or stenosis present.   Mitral Valve No mitral valve regurgitation or significant stenosis is present. Mitral annular calcification is present.   Tricuspid Valve The tricuspid valve is structurally normal with no significant regurgitation or significant stenosis present. Estimated right ventricular systolic pressure from tricuspid regurgitation is normal (<35 mmHg).    Pulmonic Valve The pulmonic valve is structurally normal with no regurgitation or significant stenosis present.   Greater Vessels No dilation of the aortic root is present.   Pericardium The pericardium is normal. There is no evidence of pericardial effusion. .             []  Pathology  []  Old records  [x]  Other:    Intake/Output Summary (Last 24 hours) at 8/9/2023 1603  Last data filed at 8/8/2023 2006  Gross per 24 hour   Intake --   Output 300 ml   Net -300 ml         Assessment & Plan   Assessment / Plan     Assessment/Plan:  Acute/subacute CVA involving right kenia  Chronic small vessel ischemic changes involving basal ganglia   Uncontrolled diabetes, likely type II, new diagnosis  Hyperlipidemia  Uncontrolled hypertension  Vitamin D insufficiency  Hypokalemia      Appreciate teleneurology recommendations  Continue aspirin 81 mg daily and Plavix 75 mg daily  Continue high intensity atorvastatin  Continue neurochecks per protocol  TTE shows preserved ejection fraction without significant valvular disease  No arrhythmias on telemetry, continue to monitor  New diagnosis of diabetes discussed.   Check TSH and microalbumin urine. Given hemoglobin A1c will plan to discharge on insulin and oral agent.  Will consult DM educator. Start Levemir 10 units daily. Continue moderate dose SSI per protocol. Goal glucose 140-180.  Give p.o. potassium chloride 40 mEq x 1. Trend renal function and electrolytes  Start thiazide diuretic for blood pressure management.   Start daily vitamin D supplementation  Outpatient referral to sleep medicine for CHARLEE evaluation  SLP following.  Modified barium swallow ordered.  Advance diet per their recommendations.  Keep head of elevated, continue aspiration precautions.  Continue PT/OT.  Up with assistance.  Fall precautions  DVT prophylaxis: Lovenox 40 mg daily    Discussed plan with RN.    DVT prophylaxis:  Medical and mechanical DVT prophylaxis orders are present.    CODE STATUS:   Code  Status (Patient has no pulse and is not breathing): CPR (Attempt to Resuscitate)  Medical Interventions (Patient has pulse or is breathing): Full Support      Electronically signed by Gaudencio Franz DO, 08/09/23, 7:12 AM EDT.

## 2023-08-09 NOTE — PLAN OF CARE
Goal Outcome Evaluation:      FUNCTIONAL ASSESSMENT INSTRUMENT: Patient currently scored a level 4 of 7 on Functional Communication Measures for swallowing indicating a 40-59% limitation in function.     ASSESSMENT/ PLAN OF CARE:  Pt presents with limitations, noted below, that impede his ability to swallow safely. The skills of a therapist will be required to safely and effectively implement the following treatment plan to restore maximal level of function.      PROBLEMS:  1.  Dysphagia                 TREATMENT: Dysphagia therapy to ensure diet tolerance, advance to least restrictive diet and analyze for aspiration risk.     FREQUENCY/DURATION: Twice daily 5 times a week     REHAB POTENTIAL:  Pt has good rehab potential.  The following limitations may influence improvement/ length of tx medical status.     RECOMMENDATIONS:   1.   DIET: Hold on diet recommendations until modified barium swallow can be completed later today.     Pt/responsible party agrees with plan of care and has been informed of all alternatives, risks and benefits.     EDUCATION  The patient has been educated in the following areas:   Dysphagia (Swallowing Impairment).

## 2023-08-09 NOTE — PLAN OF CARE
Goal Outcome Evaluation:  Plan of Care Reviewed With: patient, family        Progress: no change  Outcome Evaluation: Patient has experienced decline in function from baseline status, presenting w/ deficits related to strength, coordination, balance, transfers and mobility that impede patient independence with activities of daily living.  Patient would benefit from skilled Occupational Therapy intervention to maxamize patient safety, and promote return to baseline independence.      Anticipated Discharge Disposition (OT): inpatient rehabilitation facility

## 2023-08-10 LAB
ANION GAP SERPL CALCULATED.3IONS-SCNC: 13 MMOL/L (ref 5–15)
BUN SERPL-MCNC: 19 MG/DL (ref 8–23)
BUN/CREAT SERPL: 17 (ref 7–25)
CALCIUM SPEC-SCNC: 9.8 MG/DL (ref 8.6–10.5)
CHLORIDE SERPL-SCNC: 100 MMOL/L (ref 98–107)
CO2 SERPL-SCNC: 24 MMOL/L (ref 22–29)
CREAT SERPL-MCNC: 1.12 MG/DL (ref 0.76–1.27)
EGFRCR SERPLBLD CKD-EPI 2021: 73.8 ML/MIN/1.73
GLUCOSE BLDC GLUCOMTR-MCNC: 103 MG/DL (ref 70–99)
GLUCOSE BLDC GLUCOMTR-MCNC: 234 MG/DL (ref 70–99)
GLUCOSE BLDC GLUCOMTR-MCNC: 244 MG/DL (ref 70–99)
GLUCOSE BLDC GLUCOMTR-MCNC: 250 MG/DL (ref 70–99)
GLUCOSE BLDC GLUCOMTR-MCNC: 282 MG/DL (ref 70–99)
GLUCOSE SERPL-MCNC: 204 MG/DL (ref 65–99)
POTASSIUM SERPL-SCNC: 3.5 MMOL/L (ref 3.5–5.2)
SODIUM SERPL-SCNC: 137 MMOL/L (ref 136–145)
WHOLE BLOOD HOLD SPECIMEN: NORMAL

## 2023-08-10 PROCEDURE — 99233 SBSQ HOSP IP/OBS HIGH 50: CPT | Performed by: INTERNAL MEDICINE

## 2023-08-10 PROCEDURE — 92526 ORAL FUNCTION THERAPY: CPT

## 2023-08-10 PROCEDURE — 63710000001 INSULIN REGULAR HUMAN PER 5 UNITS: Performed by: PHYSICIAN ASSISTANT

## 2023-08-10 PROCEDURE — 63710000001 INSULIN REGULAR HUMAN PER 5 UNITS: Performed by: INTERNAL MEDICINE

## 2023-08-10 PROCEDURE — 80048 BASIC METABOLIC PNL TOTAL CA: CPT | Performed by: INTERNAL MEDICINE

## 2023-08-10 PROCEDURE — 97116 GAIT TRAINING THERAPY: CPT

## 2023-08-10 PROCEDURE — 97110 THERAPEUTIC EXERCISES: CPT

## 2023-08-10 PROCEDURE — 82948 REAGENT STRIP/BLOOD GLUCOSE: CPT

## 2023-08-10 PROCEDURE — 99232 SBSQ HOSP IP/OBS MODERATE 35: CPT | Performed by: STUDENT IN AN ORGANIZED HEALTH CARE EDUCATION/TRAINING PROGRAM

## 2023-08-10 PROCEDURE — 63710000001 INSULIN DETEMIR PER 5 UNITS: Performed by: INTERNAL MEDICINE

## 2023-08-10 PROCEDURE — 97112 NEUROMUSCULAR REEDUCATION: CPT

## 2023-08-10 PROCEDURE — 25010000002 ENOXAPARIN PER 10 MG: Performed by: INTERNAL MEDICINE

## 2023-08-10 RX ADMIN — INSULIN HUMAN 6 UNITS: 100 INJECTION, SOLUTION PARENTERAL at 12:33

## 2023-08-10 RX ADMIN — HYDROCHLOROTHIAZIDE 25 MG: 25 TABLET ORAL at 08:59

## 2023-08-10 RX ADMIN — ASPIRIN 81 MG: 81 TABLET, CHEWABLE ORAL at 08:59

## 2023-08-10 RX ADMIN — INSULIN HUMAN 4 UNITS: 100 INJECTION, SOLUTION PARENTERAL at 01:06

## 2023-08-10 RX ADMIN — Medication 10 ML: at 20:32

## 2023-08-10 RX ADMIN — ENOXAPARIN SODIUM 40 MG: 100 INJECTION SUBCUTANEOUS at 08:59

## 2023-08-10 RX ADMIN — Medication 10 ML: at 09:01

## 2023-08-10 RX ADMIN — CLOPIDOGREL BISULFATE 75 MG: 75 TABLET ORAL at 08:59

## 2023-08-10 RX ADMIN — INSULIN DETEMIR 15 UNITS: 100 INJECTION, SOLUTION SUBCUTANEOUS at 20:37

## 2023-08-10 RX ADMIN — INSULIN HUMAN 6 UNITS: 100 INJECTION, SOLUTION PARENTERAL at 20:46

## 2023-08-10 RX ADMIN — ATORVASTATIN CALCIUM 80 MG: 40 TABLET, FILM COATED ORAL at 20:32

## 2023-08-10 RX ADMIN — INSULIN HUMAN 4 UNITS: 100 INJECTION, SOLUTION PARENTERAL at 08:59

## 2023-08-10 RX ADMIN — Medication 1000 UNITS: at 08:59

## 2023-08-10 NOTE — THERAPY TREATMENT NOTE
Acute Care - Speech Language Pathology   Swallow Treatment Note JUDY Herron     Patient Name: Italo Pina  : 1960  MRN: [id number]  Today's Date: 8/10/2023               Admit Date: 2023    Visit Dx:     ICD-10-CM ICD-9-CM   1. Cerebrovascular accident (CVA), unspecified mechanism  I63.9 434.91   2. Hyperglycemia  R73.9 790.29   3. Hypertension, unspecified type  I10 401.9   4. Difficulty in walking  R26.2 719.7   5. Dysphagia, oropharyngeal  R13.12 787.22   6. Decreased activities of daily living (ADL)  Z78.9 V49.89     Patient Active Problem List   Diagnosis    CVA (cerebral vascular accident)     Past Medical History:   Diagnosis Date    Hypertension      Past Surgical History:   Procedure Laterality Date    KNEE CARTILAGE SURGERY Right    SPEECH PATHOLOGY DYSPHAGIA TREATMENT    Subjective/Behavioral Observations: Patient seen at bedside.  Family present at bedside    Current Diet:.  Mechanical soft diet-spoonfed nectar thick liquids    Treatment received: Patient seen at bedside.  Daughter present.  Results and recommendations following modified barium swallow were completed at length.  Education provided regarding silent aspiration.  Patient tolerating mechanical soft diet with spoonfed nectar thick liquids with mod cues for compensatory strategies.  Educated patient and family regarding compensatory swallow strategies.  Posted at bedside.  All acknowledged understanding    Results of treatment: As stated    Progress toward goals: Progress noted    Barriers to Achieving goals: Medical status    Plan of care:/changes in plan:  RECOMMENDATIONS:   1.  Mechanical soft diet, ground meats with sauce/gravy  2.  Spoonfed nectar thick liquids  3.  Slow rate, small bites/drinks  4.  Oral meds crushed in applesauce      EDUCATION  The patient has been educated in the following areas:   Dysphagia (Swallowing Impairment).          Terra Hilario, SLP  8/10/2023

## 2023-08-10 NOTE — PLAN OF CARE
Goal Outcome Evaluation:  Plan of Care Reviewed With: patient        Progress: no change          VSS. Pt has rested this shift. No complaints of pain. Will continue to monitor

## 2023-08-10 NOTE — PROGRESS NOTES
[facility]   Hospitalist Progress Note  Date: 8/10/2023  Patient Name: Italo Pina  : 1960  MRN: [id number]  Date of admission: 2023      Subjective   Subjective     Chief Complaint: Follow up for slurred speech and trouble swallowing    Summary: 63-year-old male, no reported past medical history who presented with acute episode of trouble swallowing and slurred speech.  Did not meet criteria for thrombolytics.  Blood pressure very high on presentation.  Teleneurology consulted. CT head without contrast did not report acute infarct or hemorrhage.  CT cerebral perfusion study no core infarction noted.  CT angiogram head and neck no large vessel occlusion or hemodynamically significant stenoses but there was diffuse atherosclerotic changes and narrowing noted within the cavernous segments of the internal carotid arteries bilaterally.  Labs notable for glucose 425 and calcium 10.9.  MRI brain with acute/subacute infarct in the right kenia.  Hemoglobin A1c greater than 13, initiated on insulin.  On aspirin and Plavix, high intensity statin.  Antihypertensive agents added.  Rehab planned.    Interval Followup:   No acute events overnight.  Afebrile.  Blood pressure trend improved.  On room air.  Tolerating modified diet.  Working with PT, still needing a lot of help with transferring and getting up.    Review of Systems  HEENT: + Trouble swallowing otherwise denied complaints  Respiratory:  No Cough, No Dyspnea  Gastrointestinal:  No Nausea, No Vomiting  Neuro: +Weakness RUE and gait instability otherwise no complaints      Objective   Objective     Vitals:   Temp:  [97.7 øF (36.5 øC)-98.6 øF (37 øC)] 98.2 øF (36.8 øC)  Heart Rate:  [] 82  Resp:  [16-18] 16  BP: (148-168)/() 156/95  Physical Exam    Constitutional: Awake, conversant, NAD   Eyes: Pupils equal and reactive, no conjunctival injections   HENT: NCAT, nares patent, MMM   Respiratory: Clear to auscultation bilaterally,  nonlabored respirations    Cardiovascular: RRR, no murmurs, no edema   Gastrointestinal: Positive bowel sounds, soft, nontender, nondistended   Neurologic: Alert, Cranial Nerves grossly intact, mild dysarthria, 4 out of 5 muscle strength in left upper extremity otherwise strength intact and symmetrical   Skin: Extremities warm, no rashes or wounds    Result Review    Result Review:  I have personally reviewed the following over the last 24 hours (07:00 to 07:00) and agree with the following findings  [x]  Laboratory  CBC          8/8/2023    12:33   CBC   WBC 6.34    RBC 4.77    Hemoglobin 14.7    Hematocrit 40.7    MCV 85.3    MCH 30.8    MCHC 36.1    RDW 12.7    Platelets 192      Lipid Panel          8/9/2023    04:27   Lipid Panel   Total Cholesterol 246    Triglycerides 251    HDL Cholesterol 46    VLDL Cholesterol 46    LDL Cholesterol  154    LDL/HDL Ratio 3.26      Hemoglobin A1c 13.60    Vitamin D OH 20.6    []  Microbiology  []  Radiology  [x]  EKG/Telemetry monitor personally reviewed: NSR, no events  [x]  Cardiology/Vascular   TTE  Left Ventricle Left ventricular systolic function is normal. Calculated left ventricular EF = 57.5%     Normal left ventricular cavity size noted. Left ventricular wall thickness is consistent with concentric hypertrophy. All left ventricular wall segments contract normally.   Right Ventricle Normal right ventricular cavity size, wall thickness, systolic function and septal motion noted.   Left Atrium Normal left atrial size and volume noted.   Right Atrium Normal right atrial cavity size noted.   Aortic Valve The aortic valve is structurally normal with no regurgitation or stenosis present.   Mitral Valve No mitral valve regurgitation or significant stenosis is present. Mitral annular calcification is present.   Tricuspid Valve The tricuspid valve is structurally normal with no significant regurgitation or significant stenosis present. Estimated right ventricular systolic  pressure from tricuspid regurgitation is normal (<35 mmHg).   Pulmonic Valve The pulmonic valve is structurally normal with no regurgitation or significant stenosis present.   Greater Vessels No dilation of the aortic root is present.   Pericardium The pericardium is normal. There is no evidence of pericardial effusion. .             []  Pathology  []  Old records  [x]  Other:    Intake/Output Summary (Last 24 hours) at 8/10/2023 1151  Last data filed at 8/10/2023 0900  Gross per 24 hour   Intake 300 ml   Output 250 ml   Net 50 ml         Assessment & Plan   Assessment / Plan     Assessment/Plan:  Acute/subacute CVA involving right kenia  Chronic small vessel ischemic changes involving basal ganglia   Uncontrolled diabetes, likely type II, new diagnosis  Hyperlipidemia  Uncontrolled hypertension  Vitamin D insufficiency  Hypokalemia, resolved      Appreciate teleneurology recommendations  Continue aspirin 81 mg daily and Plavix 75 mg daily for 3 weeks then continue just on monotherapy with aspirin  Continue high intensity atorvastatin  Continue neurochecks per protocol  No arrhythmias on telemetry, continue to monitor  Increase Levemir to 15 units daily.  Continue moderate dose SSI per protocol. Goal glucose 140-180.  Reinforce diabetic diet and insulin teaching.  His daughter is familiar with checking blood sugars and giving insulin and will assist at home.  Blood pressure trend improved.  Potassium normalized and creatinine WNL. Continue hydrochlorothiazide 25 mg daily   Need for significant lifestyle changes discussed  Continue daily vitamin D supplementation  Outpatient referral to sleep medicine for CHARLEE evaluation  SLP following.  Continued modified diet, aspiration precautions   continue PT/OT.  Up with assistance.  Fall precautions  DVT prophylaxis: Lovenox 40 mg daily    Disposition: [facility] referral pending    Discussed plan with RN.    DVT prophylaxis:  Medical and mechanical DVT prophylaxis orders are  present.    CODE STATUS:   Code Status (Patient has no pulse and is not breathing): CPR (Attempt to Resuscitate)  Medical Interventions (Patient has pulse or is breathing): Full Support    Electronically signed by Gaudencio Franz DO, 08/10/23, 11:54 AM EDT.

## 2023-08-10 NOTE — CONSULTS
"Nutrition Services    Patient Name: Italo Pina  YOB: 1960  MRN: [id number]  Admission date: 8/8/2023      CLINICAL NUTRITION ASSESSMENT      Reason for Assessment  Need for education     H&P:    Past Medical History:   Diagnosis Date    Hypertension         Current Problems:   Active Hospital Problems    Diagnosis     **CVA (cerebral vascular accident)         Nutrition/Diet History         Narrative     Patient admitted s/p CVA. Followed by RD for nutrition consult.     Patient has elevated A1C of 13.6%, elevated triglycerides and cholesterol levels, and history of hypertension. Provided and reviewed nutrition therapy materials with patient and family member at bedside regarding diabetes and triglycerides/cholesterol. Discussed labs and how these can contribute to stroke, and how getting them under control may decrease risk of future strokes. Patient and family member v/u.     RD will CTM per protocol.      Anthropometrics        Current Height, Weight Height: 167.6 cm (66\")  Weight: 74.1 kg (163 lb 5.8 oz)   Current BMI Body mass index is 26.37 kg/mý.       Weight Hx  Wt Readings from Last 30 Encounters:   08/08/23 1430 74.1 kg (163 lb 5.8 oz)   08/08/23 1416 73.6 kg (162 lb 4.1 oz)            Wt Change Observation No weight history available. BMI normal for age.     Estimated/Assessed Needs       Energy Requirements 20-25 kcal/kg   EST Needs (kcal/day) 1813-3604       Protein Requirements 1.0 g/kg   EST Daily Needs (g/day) 75       Fluid Requirements 25 ml/kg    Estimated Needs (mL/day) 1855     Labs/Medications         Pertinent Labs Reviewed.   Results from last 7 days   Lab Units 08/10/23  0411 08/09/23  0633 08/08/23  1233   SODIUM mmol/L 137 134* 134*   POTASSIUM mmol/L 3.5 3.0* 4.2   CHLORIDE mmol/L 100 98 95*   CO2 mmol/L 24.0 22.6 25.1   BUN mg/dL 19 15 15   CREATININE mg/dL 1.12 0.95 1.12   CALCIUM mg/dL 9.8 9.6 10.9*   BILIRUBIN mg/dL  --  0.7 0.5   ALK PHOS U/L  --  77 96   ALT " (SGPT) U/L  --  23 25   AST (SGOT) U/L  --  22 23   GLUCOSE mg/dL 204* 257* 425*     Results from last 7 days   Lab Units 08/09/23  0633 08/09/23  0427 08/08/23  1233   MAGNESIUM mg/dL 2.0  --   --    PHOSPHORUS mg/dL  --   --  3.6   HEMOGLOBIN g/dL  --   --  14.7   HEMATOCRIT %  --   --  40.7   TRIGLYCERIDES mg/dL  --  251*  --      No results found for: COVID19  Lab Results   Component Value Date    HGBA1C 13.60 (H) 08/08/2023         Pertinent Medications Reviewed.     Current Nutrition Orders & Evaluation of Intake       Oral Nutrition     Current PO Diet Diet: Diabetic Diets; Consistent Carbohydrate; Texture: Mechanical Ground (NDD 2); Fluid Consistency: Nectar Thick   Supplement No active supplement orders       Malnutrition Severity Assessment                Nutrition Diagnosis         Nutrition Dx Problem 1 Altered nutrition related lab values related to lack of prior nutrition related education as evidenced by patient report., family report., and elevated Hgb A1C, elevated triglycerides and cholesterol.       Nutrition Intervention         MNT for high triglycerides/cholesterol and diabetes      Medical Nutrition Therapy/Nutrition Education          Learner     Readiness Patient and Family  Acceptance     Method     Response Explanation and Written Material  Verbalizes understanding     Monitor/Evaluation        Monitor Per protocol, PO intake, Pertinent labs, Weight, POC/GOC, Swallow function, Diet advancement       Nutrition Discharge Plan         To be determined       Electronically signed by:  Dharmesh Morales RD  08/10/23 14:15 EDT

## 2023-08-10 NOTE — PROGRESS NOTES
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 Workup so far has revealed subacute infarction in the right kenia which correlates with the patient's symptoms. The infarction is thrombotic likely due to multiple uncontrol vascular risk factors including hypertension, hyperlipidemia, severe uncontrolled diabetes. In addition, the patient's family reported severe snoring he likely has obstructive sleep apnea which is another risk factor for CVA.    Our recommendations are outlined below    Antithrombotic Medications :  Initiate dual antiplatelet therapy with Aspirin 81 mg daily and Clopidogrel 75 mg daily  Already Ordered  Statins for LDL goal less than 70  Already Ordered  Dual antiplatelet therapy for 3 weeks, then stop clopidogrel    Nursing recommendations :  Continue with Telemetry    Consultations :  Recommend Speech therapy if failed dysphagia screenPhysical therapy/Occupational therapyInpatient rehab if recommended by physical/occupational therapy    DVT Prophylaxis :  Choice of Primary Team    Dispositions :  No further recommendations  Will signoff please contact for any questions  Outpatient Neurology follow up in 1-3 weeks    Subjective  Continues to have dysarthria. He is also experiencing increased left arm weakness from previous evaluation.    Imaging  MRI brain, independently reviewed, demonstrated acute right kenia infarction. Angiography did not demonstrate any significant intracranial stenosis    TTE normal    Labs  , A1c 13.6       Examination  BP(160/84), Pulse(69), Temp(98.2), Resp(16),    Neuro Exam:  General:  Alert,Awake, Oriented to Time, Place, Person    Speech: Dysarthric:    Language: Intact:    Face: Facial Droop: Left    Facial Sensation: Intact:    Visual Fields: Intact:    Extraocular Movements: Intact:    Motor Exam: Drift: LUE    Sensation: Intact:    Coordination: Intact:           Patient/Family was informed the Neurology Consult would happen via TeleHealth consult by way of interactive audio and video telecommunications and consented to receiving care in this manner.    Telehealth Neurology consultation was provided. I spent 22 minutes providing telehealth care. This includes time spent for face to face visit via telemedicine, review of medical records, imaging studies and discussion of findings with providers, the patient and/or family.      Dr Mark Loving      [facility]  For Inpatient follow-up with [facility] physician please call [facility] [phone]. This is not an outpatient service. Post hospital discharge, please contact hospital directly.

## 2023-08-10 NOTE — THERAPY TREATMENT NOTE
Patient Name: Italo Pina  : 1960    MRN: [id number]                              Today's Date: 8/10/2023       Admit Date: 2023    Visit Dx:     ICD-10-CM ICD-9-CM   1. Cerebrovascular accident (CVA), unspecified mechanism  I63.9 434.91   2. Hyperglycemia  R73.9 790.29   3. Hypertension, unspecified type  I10 401.9   4. Difficulty in walking  R26.2 719.7   5. Dysphagia, oropharyngeal  R13.12 787.22   6. Decreased activities of daily living (ADL)  Z78.9 V49.89     Patient Active Problem List   Diagnosis    CVA (cerebral vascular accident)     Past Medical History:   Diagnosis Date    Hypertension      Past Surgical History:   Procedure Laterality Date    KNEE CARTILAGE SURGERY Right       General Information       Row Name 08/10/23 1122          OT Time and Intention    Document Type therapy note (daily note)  -ES     Mode of Treatment individual therapy;occupational therapy  -ES       Row Name 08/10/23 1122          General Information    Existing Precautions/Restrictions fall  -ES       Row Name 08/10/23 1122          Cognition    Orientation Status (Cognition) oriented x 3  patient pleasant and cooperative, motivated to improve throughout therapy session. Patient daughters present, very encouraging of patient progress and participation.  -ES       Row Name 08/10/23 1122          Safety Issues, Functional Mobility    Impairments Affecting Function (Mobility) balance;pain;strength;postural/trunk control;grasp;motor control;coordination  -ES               User Key  (r) = Recorded By, (t) = Taken By, (c) = Cosigned By      Initials Name Provider Type    ES Amanda Beaulieu, LEOR/L, CSRS Occupational Therapist                     Mobility/ADL's       Row Name 08/10/23 1129          Bed Mobility    Bed Mobility supine-sit;sit-supine  -ES     Supine-Sit Steele (Bed Mobility) contact guard;1 person assist  -ES     Sit-Supine Steele (Bed Mobility) minimum assist (75% patient effort);1 person  assist  -ES     Bed Mobility, Safety Issues decreased use of arms for pushing/pulling  -ES               User Key  (r) = Recorded By, (t) = Taken By, (c) = Cosigned By      Initials Name Provider Type    ES Amanda Beaulieu, SVETLANA/L, CSRS Occupational Therapist                   Obj/Interventions       Row Name 08/10/23 1129          Shoulder (Therapeutic Exercise)    Shoulder (Therapeutic Exercise) AAROM (active assistive range of motion)  -ES     Shoulder AAROM (Therapeutic Exercise) --  x10 shoulder flexion AAROM lying supine in bed prior to EOB activities. Patient completes table slides LUE shoulder flexion and extension 3 sets of 5 with assist for trunk control to reduce compensation. Quick stretch followed by full slide completed.  -ES       Row Name 08/10/23 1129          Elbow/Forearm (Therapeutic Exercise)    Elbow/Forearm (Therapeutic Exercise) AROM (active range of motion);strengthening exercise  -ES     Elbow/Forearm AROM (Therapeutic Exercise) left;flexion;extension  Following weighted exercise, patient is able to complete x10 left elbow flexion/extension achieving full arc of motion with AROM.  -ES     Elbow/Forearm Strengthening (Therapeutic Exercise) sitting;left;flexion;extension  Patient completes left elbow flexion and extension with 1lb wrist weight for error augmentation prior to AROM to increase elbow range of motion. Patient tolerates x 10 with 1lb wrist weight and cueing for full ROM.  -ES       Row Name 08/10/23 1129          Motor Skills    Motor Skills motor control/coordination interventions;neuro-muscular function  -ES     Motor Control/Coordination Interventions weight-bearing activities;neuro-muscular re-education;functional task specific training;motor pattern re-training  -ES     Therapeutic Exercise shoulder;elbow/forearm  -ES       Row Name 08/10/23 1129          Balance    Balance Assessment sitting dynamic balance  -ES     Dynamic Sitting Balance contact guard;verbal cues;non-verbal  cues (demo/gesture)  -ES     Position, Sitting Balance sitting edge of bed  -ES     Comment, Balance patent requires verbal and tactile cueing to maintain erect trunk posture seated edge of bed. Cueing provided to maintain neutral neck positioning.  -ES       Row Name 08/10/23 1129          Neuromuscular Re-education    Interventions (Neuromuscular Re-education) weight bearing  Patient completes left upper extremity weight bearing seated edge of bed to promote proximal stabilization, counteract flexor synergy, and promote upper extremity function. Patient tolerates 3 x 5 trials, demonstrating increased strength with each trial.  -ES               User Key  (r) = Recorded By, (t) = Taken By, (c) = Cosigned By      Initials Name Provider Type    Amanda Yarbrough, OTR/L, CSRS Occupational Therapist                   Goals/Plan    No documentation.                  Clinical Impression       Row Name 08/10/23 1138          Plan of Care Review    Plan of Care Reviewed With patient;daughter  -ES     Progress improving  -ES     Outcome Evaluation Patient with good participation in therapy session today, patient continues to demonstrate motivation to improve to independent baseline, assisting with therapy participation. Patient completes upper extremity AROM and strengthening this session along with neuromuscular reeducation to increase left upper extremity range of motion and strength. Patient demonstrates increased biceps/triceps and shoulder strength this session. Patient would benefit from continued skilled OT intervention to prmote return to independent baseline.  -ES               User Key  (r) = Recorded By, (t) = Taken By, (c) = Cosigned By      Initials Name Provider Type    Amanda Yarbrough, OTR/L, CSRS Occupational Therapist                   Outcome Measures       Row Name 08/10/23 113          How much help from another is currently needed...    Putting on and taking off regular lower body clothing? 2  -ES      Bathing (including washing, rinsing, and drying) 2  -ES     Toileting (which includes using toilet bed pan or urinal) 2  -ES     Putting on and taking off regular upper body clothing 2  -ES     Taking care of personal grooming (such as brushing teeth) 2  -ES     Eating meals 2  -ES     AM-PAC 6 Clicks Score (OT) 12  -ES       Row Name 08/10/23 1139          Functional Assessment    Outcome Measure Options AM-PAC 6 Clicks Daily Activity (OT);Optimal Instrument  -ES       Row Name 08/10/23 1139          Optimal Instrument    Optimal Instrument Optimal - 3  -ES     Bending/Stooping 3  -ES     Standing 3  -ES     Reaching 3  -ES               User Key  (r) = Recorded By, (t) = Taken By, (c) = Cosigned By      Initials Name Provider Type    Amanda Yarbrough, OTR/L, CSRS Occupational Therapist                      OT Recommendation and Plan  Planned Therapy Interventions (OT): activity tolerance training, BADL retraining, functional balance retraining, occupation/activity based interventions, patient/caregiver education/training, strengthening exercise, transfer/mobility retraining, neuromuscular control/coordination retraining, cognitive/visual perception retraining  Plan of Care Review  Plan of Care Reviewed With: patient, daughter  Progress: improving  Outcome Evaluation: Patient with good participation in therapy session today, patient continues to demonstrate motivation to improve to independent baseline, assisting with therapy participation. Patient completes upper extremity AROM and strengthening this session along with neuromuscular reeducation to increase left upper extremity range of motion and strength. Patient demonstrates increased biceps/triceps and shoulder strength this session. Patient would benefit from continued skilled OT intervention to prmote return to independent baseline.     Time Calculation:   Evaluation Complexity (OT)  Review Occupational Profile/Medical/Therapy History Complexity: brief/low  complexity  Assessment, Occupational Performance/Identification of Deficit Complexity: 3-5 performance deficits  Clinical Decision Making Complexity (OT): problem focused assessment/low complexity  Overall Complexity of Evaluation (OT): low complexity     Time Calculation- OT       Row Name 08/10/23 1140             Time Calculation- OT    OT Received On 08/10/23  -ES      OT Goal Re-Cert Due Date 08/18/23  -ES         Timed Charges    86511 -  OT Neuromuscular Reeducation Minutes 30  -ES         Total Minutes    Timed Charges Total Minutes 30  -ES       Total Minutes 30  -ES                User Key  (r) = Recorded By, (t) = Taken By, (c) = Cosigned By      Initials Name Provider Type    ES Amanda Beaulieu, OTR/L, CSRS Occupational Therapist                  Therapy Charges for Today       Code Description Service Date Service Provider Modifiers Qty    58343062070 HC OT EVAL LOW COMPLEXITY 4 8/9/2023 Amanda Beaulieu, OTR/L, CSRS GO 1    [id number]  OT NEUROMUSC RE EDUCATION EA 15 MIN 8/10/2023 Amanda Beaulieu, OTR/L, CSRS GO 2                 Amanda Beaulieu OTR/L, CSRS  8/10/2023

## 2023-08-10 NOTE — PLAN OF CARE
Problem: Fall Injury Risk  Goal: Absence of Fall and Fall-Related Injury  Outcome: Ongoing, Progressing  Intervention: Identify and Manage Contributors  Recent Flowsheet Documentation  Taken 8/9/2023 1925 by Tessy Elliott RN  Medication Review/Management: medications reviewed     Problem: Skin Injury Risk Increased  Goal: Skin Health and Integrity  Outcome: Ongoing, Progressing  Intervention: Optimize Skin Protection  Recent Flowsheet Documentation  Taken 8/9/2023 1925 by Tessy Elliott RN  Pressure Reduction Devices: pressure-redistributing mattress utilized     Problem: Adjustment to Illness (Stroke, Ischemic/Transient Ischemic Attack)  Goal: Optimal Coping  Outcome: Ongoing, Progressing  Intervention: Support Psychosocial Response to Stroke  Recent Flowsheet Documentation  Taken 8/9/2023 1925 by Tessy Elliott RN  Family/Support System Care: support provided     Problem: Cerebral Tissue Perfusion (Stroke, Ischemic/Transient Ischemic Attack)  Goal: Optimal Cerebral Tissue Perfusion  Outcome: Ongoing, Progressing     Problem: Cognitive Impairment (Stroke, Ischemic/Transient Ischemic Attack)  Goal: Optimal Cognitive Function  Outcome: Ongoing, Progressing     Problem: Communication Impairment (Stroke, Ischemic/Transient Ischemic Attack)  Goal: Improved Communication Skills  Outcome: Ongoing, Progressing     Problem: Functional Ability Impaired (Stroke, Ischemic/Transient Ischemic Attack)  Goal: Optimal Functional Ability  Outcome: Ongoing, Progressing     Problem: Swallowing Impairment (Stroke, Ischemic/Transient Ischemic Attack)  Goal: Optimal Eating and Swallowing without Aspiration  Outcome: Ongoing, Progressing  Intervention: Optimize Eating and Swallowing  Recent Flowsheet Documentation  Taken 8/9/2023 1925 by Tessy Elliott RN  Feeding/Eating Techniques: monitored for feeding stress cues     Problem: Diabetes Comorbidity  Goal: Blood Glucose Level Within Targeted Range  Outcome: Ongoing,  Progressing  Intervention: Monitor and Manage Glycemia  Recent Flowsheet Documentation  Taken 8/9/2023 1925 by Tessy Elliott RN  Glycemic Management: blood glucose monitored     Problem: Hypertension Comorbidity  Goal: Blood Pressure in Desired Range  Outcome: Ongoing, Progressing  Intervention: Maintain Blood Pressure Management  Recent Flowsheet Documentation  Taken 8/9/2023 1925 by Tessy Elliott RN  Medication Review/Management: medications reviewed     Problem: Adult Inpatient Plan of Care  Goal: Plan of Care Review  Outcome: Ongoing, Progressing  Goal: Patient-Specific Goal (Individualized)  Outcome: Ongoing, Progressing  Goal: Absence of Hospital-Acquired Illness or Injury  Outcome: Ongoing, Progressing  Goal: Optimal Comfort and Wellbeing  Outcome: Ongoing, Progressing  Intervention: Provide Person-Centered Care  Recent Flowsheet Documentation  Taken 8/9/2023 1925 by Tessy Elliott RN  Trust Relationship/Rapport:   care explained   questions encouraged  Goal: Readiness for Transition of Care  Outcome: Ongoing, Progressing   Goal Outcome Evaluation:

## 2023-08-10 NOTE — CONSULTS
"Provided patient with written material, Diabetes Survival Skills, and covered the material in depth with the patient and his daughter. Provided education and demonstration of self-administration of insulin via insulin pen. Provided education regarding diet. Patient admits to drinking \"three tall glasses of Dr. Pepper every day.\" Discussed how beverages with high carbohydrate concentration impacts blood glucose. Answered all questions at this time.    Provided patient with multiple Clinical References to refer to once home. Patients daughter states she will assist her parents in accessing the video references provided by [employer]. Provided DM CM contact information for questions after discharge.  "

## 2023-08-10 NOTE — THERAPY TREATMENT NOTE
Acute Care - Physical Therapy Treatment Note  [facility]     Patient Name: Italo Pina  : 1960  MRN: [id number]  Today's Date: 8/10/2023      Visit Dx:     ICD-10-CM ICD-9-CM   1. Cerebrovascular accident (CVA), unspecified mechanism  I63.9 434.91   2. Hyperglycemia  R73.9 790.29   3. Hypertension, unspecified type  I10 401.9   4. Difficulty in walking  R26.2 719.7   5. Dysphagia, oropharyngeal  R13.12 787.22   6. Decreased activities of daily living (ADL)  Z78.9 V49.89     Patient Active Problem List   Diagnosis    CVA (cerebral vascular accident)     Past Medical History:   Diagnosis Date    Hypertension      Past Surgical History:   Procedure Laterality Date    KNEE CARTILAGE SURGERY Right      PT Assessment (last 12 hours)       PT Evaluation and Treatment       Row Name 08/10/23 1528          Physical Therapy Time and Intention    Subjective Information complains of;weakness  -RH     Document Type therapy note (daily note)  -     Mode of Treatment physical therapy  -     Patient Effort adequate  -       Row Name 08/10/23 1528          Pain    Additional Documentation Pain Scale: FACES Pre/Post-Treatment (Group)  -[facility] Name 08/10/23 1528          Pain Scale: FACES Pre/Post-Treatment    Pain: FACES Scale, Pretreatment 0-->no hurt  -     Posttreatment Pain Rating 0-->no hurt  -[facility] Name 08/10/23 1528          Strength (Manual Muscle Testing)    Strength (Manual Muscle Testing) --  Pt LLE strength grossly 3+/5.  -       Row Name 08/10/23 1528          Bed Mobility    Bed Mobility scooting/bridging;supine-sit  -     All Activities, [place] (Bed Mobility) contact guard;minimum assist (75% patient effort)  -     Scooting/Bridging [place] (Bed Mobility) contact guard;minimum assist (75% patient effort)  -     Supine-Sit [place] (Bed Mobility) contact guard;minimum assist (75% patient effort)  -     Bed Mobility, Safety Issues decreased use of arms for  pushing/pulling;decreased use of legs for bridging/pushing  -     Assistive Device (Bed Mobility) bed rails  -     Comment, (Bed Mobility) Pt maintains sitting with CGA.  -       Row Name 08/10/23 1528          Transfers    Transfers sit-stand transfer;stand-sit transfer  -       Row Name 08/10/23 1528          Sit-Stand Transfer    Sit-Stand [place] (Transfers) contact guard;minimum assist (75% patient effort)  -     Assistive Device (Sit-Stand Transfers) walker, front-wheeled  -RH       Row Name 08/10/23 1528          Stand-Sit Transfer    Stand-Sit [place] (Transfers) contact guard;minimum assist (75% patient effort)  -     Assistive Device (Stand-Sit Transfers) walker, front-wheeled  -RH       Row Name 08/10/23 1528          Gait/Stairs (Locomotion)    Gait/Stairs Locomotion gait/ambulation independence;gait/ambulation assistive device;distance ambulated;gait pattern;gait deviations  -     [place] Level (Gait) minimum assist (75% patient effort)  -     Assistive Device (Gait) walker, front-wheeled  -     Distance in Feet (Gait) 45  -     Pattern (Gait) 3-point;step-to  -RH     Deviations/Abnormal Patterns (Gait) base of support, narrow;gait speed decreased;stride length decreased;weight shifting decreased  -     Bilateral Gait Deviations heel strike decreased;foot drop/toe drag  -     Gait Assessment/Intervention Pt amb with RW and min assist with 3 point step-to gait pattern with narrow base of support.  Pt with intermittent drop foot.  -       Row Name 08/10/23 1528          Balance    Dynamic Standing Balance minimal assist  -     Position/Device Used, Standing Balance walker, front-wheeled  -       Row Name 08/10/23 1528          Progress Summary (PT)    Progress Toward Functional Goals (PT) progress toward functional goals is gradual  -               User Key  (r) = Recorded By, (t) = Taken By, (c) = Cosigned By      Initials Name Provider Type    GIOVANNI Franklin  CORINNA Paige Physical Therapist Assistant                  Left Lower Extremity   Exercise  Reps  Sets    Short arc quads   10 2   Heel slides  10 2   Ankle pumps  10 2   Quad sets  10 2   Straight leg raise  10 2   Hip ab/adduction 10 2        Physical Therapy Education       Title: PT OT SLP Therapies (Done)       Topic: Physical Therapy (Done)       Point: Mobility training (Done)       Learning Progress Summary             Patient Acceptance, E,TB, VU by RADHA at 8/9/2023 0949                         Point: Precautions (Done)       Learning Progress Summary             Patient Acceptance, E,TB, VU by RADHA at 8/9/2023 0949                                         User Key       Initials Effective Dates Name Provider Type Discipline    RADHA 06/03/21 -  Steve Woodard, PT Physical Therapist PT                  PT Recommendation and Plan     Progress Summary (PT)  Progress Toward Functional Goals (PT): progress toward functional goals is gradual   Outcome Measures       Row Name 08/10/23 1500 08/09/23 0900          How much help from another person do you currently need...    Turning from your back to your side while in flat bed without using bedrails? 4  -RH 4  -RADHA     Moving from lying on back to sitting on the side of a flat bed without bedrails? 3  -RH 3  -RADHA     Moving to and from a bed to a chair (including a wheelchair)? 3  -RH 3  -RADHA     Standing up from a chair using your arms (e.g., wheelchair, bedside chair)? 3  -RH 3  -RADHA     Climbing 3-5 steps with a railing? 2  -RH 2  -RADHA     To walk in hospital room? 3  -RH 3  -RADHA     AM-PAC 6 Clicks Score (PT) 18  -RH 18  -RADHA        Functional Assessment    Outcome Measure Options -- AM-PAC 6 Clicks Basic Mobility (PT)  -RADHA               User Key  (r) = Recorded By, (t) = Taken By, (c) = Cosigned By      Initials Name Provider Type    Royal Solano PTA Physical Therapist Assistant    Steve Vieira, PT Physical Therapist                     Time Calculation:    PT  Charges       Row Name 08/10/23 1527             Timed Charges    38752 - PT Therapeutic Exercise Minutes 15  -RH      83223 - Gait Training Minutes  7  -RH      52626 - PT Therapeutic Activity Minutes 4  -RH         Total Minutes    Timed Charges Total Minutes 26  -RH       Total Minutes 26  -RH                User Key  (r) = Recorded By, (t) = Taken By, (c) = Cosigned By      Initials Name Provider Type     Royal Franklin PTA Physical Therapist Assistant                  Therapy Charges for Today       Code Description Service Date Service Provider Modifiers Qty    15878568423 HC PT THER PROC EA 15 MIN 8/10/2023 Royal Franklin PTA GP 1    79344035607 HC GAIT TRAINING EA 15 MIN 8/10/2023 Royal Franklin PTA GP 1            PT G-Codes  Outcome Measure Options: AM-PAC 6 Clicks Daily Activity (OT), Optimal Instrument  AM-PAC 6 Clicks Score (PT): 18  AM-PAC 6 Clicks Score (OT): 12    Royal Franklin PTA  8/10/2023

## 2023-08-11 LAB
ANION GAP SERPL CALCULATED.3IONS-SCNC: 10.7 MMOL/L (ref 5–15)
BUN SERPL-MCNC: 22 MG/DL (ref 8–23)
BUN/CREAT SERPL: 20.4 (ref 7–25)
CALCIUM SPEC-SCNC: 9.7 MG/DL (ref 8.6–10.5)
CHLORIDE SERPL-SCNC: 102 MMOL/L (ref 98–107)
CO2 SERPL-SCNC: 25.3 MMOL/L (ref 22–29)
CREAT SERPL-MCNC: 1.08 MG/DL (ref 0.76–1.27)
EGFRCR SERPLBLD CKD-EPI 2021: 77.1 ML/MIN/1.73
GLUCOSE BLDC GLUCOMTR-MCNC: 178 MG/DL (ref 70–99)
GLUCOSE BLDC GLUCOMTR-MCNC: 216 MG/DL (ref 70–99)
GLUCOSE BLDC GLUCOMTR-MCNC: 225 MG/DL (ref 70–99)
GLUCOSE BLDC GLUCOMTR-MCNC: 250 MG/DL (ref 70–99)
GLUCOSE SERPL-MCNC: 132 MG/DL (ref 65–99)
POTASSIUM SERPL-SCNC: 3.3 MMOL/L (ref 3.5–5.2)
SODIUM SERPL-SCNC: 138 MMOL/L (ref 136–145)

## 2023-08-11 PROCEDURE — 82948 REAGENT STRIP/BLOOD GLUCOSE: CPT

## 2023-08-11 PROCEDURE — 97112 NEUROMUSCULAR REEDUCATION: CPT

## 2023-08-11 PROCEDURE — 97110 THERAPEUTIC EXERCISES: CPT

## 2023-08-11 PROCEDURE — 80048 BASIC METABOLIC PNL TOTAL CA: CPT | Performed by: INTERNAL MEDICINE

## 2023-08-11 PROCEDURE — 97116 GAIT TRAINING THERAPY: CPT

## 2023-08-11 PROCEDURE — 63710000001 INSULIN DETEMIR PER 5 UNITS: Performed by: INTERNAL MEDICINE

## 2023-08-11 PROCEDURE — 99233 SBSQ HOSP IP/OBS HIGH 50: CPT | Performed by: INTERNAL MEDICINE

## 2023-08-11 PROCEDURE — 25010000002 ENOXAPARIN PER 10 MG: Performed by: INTERNAL MEDICINE

## 2023-08-11 PROCEDURE — 63710000001 INSULIN REGULAR HUMAN PER 5 UNITS: Performed by: PHYSICIAN ASSISTANT

## 2023-08-11 RX ORDER — POTASSIUM CHLORIDE 750 MG/1
40 CAPSULE, EXTENDED RELEASE ORAL ONCE
Status: COMPLETED | OUTPATIENT
Start: 2023-08-11 | End: 2023-08-11

## 2023-08-11 RX ORDER — LISINOPRIL 20 MG/1
20 TABLET ORAL
Status: DISCONTINUED | OUTPATIENT
Start: 2023-08-11 | End: 2023-08-14 | Stop reason: HOSPADM

## 2023-08-11 RX ADMIN — Medication 1000 UNITS: at 08:40

## 2023-08-11 RX ADMIN — LISINOPRIL 20 MG: 20 TABLET ORAL at 08:40

## 2023-08-11 RX ADMIN — CLOPIDOGREL BISULFATE 75 MG: 75 TABLET ORAL at 08:40

## 2023-08-11 RX ADMIN — POTASSIUM CHLORIDE 40 MEQ: 10 CAPSULE, COATED, EXTENDED RELEASE ORAL at 08:40

## 2023-08-11 RX ADMIN — ENOXAPARIN SODIUM 40 MG: 100 INJECTION SUBCUTANEOUS at 08:40

## 2023-08-11 RX ADMIN — INSULIN DETEMIR 15 UNITS: 100 INJECTION, SOLUTION SUBCUTANEOUS at 21:44

## 2023-08-11 RX ADMIN — INSULIN HUMAN 6 UNITS: 100 INJECTION, SOLUTION PARENTERAL at 18:15

## 2023-08-11 RX ADMIN — INSULIN HUMAN 4 UNITS: 100 INJECTION, SOLUTION PARENTERAL at 13:00

## 2023-08-11 RX ADMIN — INSULIN HUMAN 4 UNITS: 100 INJECTION, SOLUTION PARENTERAL at 21:43

## 2023-08-11 RX ADMIN — HYDROCHLOROTHIAZIDE 25 MG: 25 TABLET ORAL at 08:40

## 2023-08-11 RX ADMIN — Medication 10 ML: at 08:41

## 2023-08-11 RX ADMIN — Medication 10 ML: at 23:01

## 2023-08-11 RX ADMIN — ASPIRIN 81 MG: 81 TABLET, CHEWABLE ORAL at 08:40

## 2023-08-11 RX ADMIN — INSULIN HUMAN 2 UNITS: 100 INJECTION, SOLUTION PARENTERAL at 08:40

## 2023-08-11 RX ADMIN — ATORVASTATIN CALCIUM 80 MG: 40 TABLET, FILM COATED ORAL at 21:44

## 2023-08-11 NOTE — PROGRESS NOTES
[facility]   Hospitalist Progress Note  Date: 2023  Patient Name: Italo Pina  : 1960  MRN: [id number]  Date of admission: 2023      Subjective   Subjective     Chief Complaint: Follow up for slurred speech and trouble swallowing    Summary: 63-year-old male, no reported past medical history who presented with acute episode of trouble swallowing and slurred speech.  Did not meet criteria for thrombolytics.  Blood pressure very high on presentation.  Teleneurology consulted. CT head without contrast did not report acute infarct or hemorrhage.  CT cerebral perfusion study no core infarction noted.  CT angiogram head and neck no large vessel occlusion or hemodynamically significant stenoses but there was diffuse atherosclerotic changes and narrowing noted within the cavernous segments of the internal carotid arteries bilaterally.  Labs notable for glucose 425 and calcium 10.9.  MRI brain with acute/subacute infarct in the right kenia.  Hemoglobin A1c greater than 13, initiated on insulin.  On aspirin and Plavix, high intensity statin.  Antihypertensive agents added.  Rehab planned.    Interval Followup:   Blood pressure 160s over 90s.    Weakness in left arm improving  Was able to ambulate with PT yesterday  Tolerating modified diet  No new issues    Review of Systems  Respiratory:  No Cough, No Dyspnea  Gastrointestinal:  No Nausea, No Vomiting  Neuro: +Weakness RUE (improved) otherwise no complaints      Objective   Objective     Vitals:   Temp:  [97.9 øF (36.6 øC)-98.2 øF (36.8 øC)] 98.2 øF (36.8 øC)  Heart Rate:  [77-93] 77  Resp:  [16] 16  BP: (155-176)/() 163/96  Physical Exam    Constitutional: Awake, conversant, NAD   Eyes: Pupils equal and reactive, no conjunctival injections   HENT: NCAT, nares patent, MMM   Respiratory: Clear to auscultation bilaterally, nonlabored respirations    Cardiovascular: RRR, no murmurs, no edema   Gastrointestinal: Positive bowel sounds, soft,  nontender, nondistended   Neurologic: Alert, Cranial Nerves grossly intact, mild dysarthria, 4 out of 5 muscle strength in left upper extremity otherwise strength intact and symmetrical   Skin: Extremities warm, no rashes or wounds    Result Review    Result Review:  I have personally reviewed the following over the last 24 hours (07:00 to 07:00) and agree with the following findings  [x]  Laboratory  CBC          8/8/2023    12:33   CBC   WBC 6.34    RBC 4.77    Hemoglobin 14.7    Hematocrit 40.7    MCV 85.3    MCH 30.8    MCHC 36.1    RDW 12.7    Platelets 192      Lipid Panel          8/9/2023    04:27   Lipid Panel   Total Cholesterol 246    Triglycerides 251    HDL Cholesterol 46    VLDL Cholesterol 46    LDL Cholesterol  154    LDL/HDL Ratio 3.26      Hemoglobin A1c 13.60    Vitamin D OH 20.6    []  Microbiology  []  Radiology  [x]  EKG/Telemetry monitor personally reviewed: NSR, no events  [x]  Cardiology/Vascular   TTE  Left Ventricle Left ventricular systolic function is normal. Calculated left ventricular EF = 57.5%     Normal left ventricular cavity size noted. Left ventricular wall thickness is consistent with concentric hypertrophy. All left ventricular wall segments contract normally.   Right Ventricle Normal right ventricular cavity size, wall thickness, systolic function and septal motion noted.   Left Atrium Normal left atrial size and volume noted.   Right Atrium Normal right atrial cavity size noted.   Aortic Valve The aortic valve is structurally normal with no regurgitation or stenosis present.   Mitral Valve No mitral valve regurgitation or significant stenosis is present. Mitral annular calcification is present.   Tricuspid Valve The tricuspid valve is structurally normal with no significant regurgitation or significant stenosis present. Estimated right ventricular systolic pressure from tricuspid regurgitation is normal (<35 mmHg).   Pulmonic Valve The pulmonic valve is structurally normal  with no regurgitation or significant stenosis present.   Greater Vessels No dilation of the aortic root is present.   Pericardium The pericardium is normal. There is no evidence of pericardial effusion. .             []  Pathology  []  Old records  [x]  Other:    Intake/Output Summary (Last 24 hours) at 8/11/2023 0953  Last data filed at 8/11/2023 0941  Gross per 24 hour   Intake 730 ml   Output 100 ml   Net 630 ml         Assessment & Plan   Assessment / Plan     Assessment/Plan:  Hypokalemia  Uncontrolled hypertension  Acute/subacute CVA involving right kenia  Chronic small vessel ischemic changes involving basal ganglia   Uncontrolled diabetes, likely type II, new diagnosis  Hyperlipidemia  Vitamin D insufficiency      Give p.o. potassium chloride 40 mEq x 1  Add Lisinopril 20 mg daily. Continue HCTZ 25 mg daily.  Trend renal function and electrolytes with daily BMP  Blood glucose trend improved.  Continue Levemir 15 units daily.  Continue moderate dose SSI per protocol. Goal glucose 140-180.  Add metformin on discharge.  Education on diabetic diet and insulin teaching provided.  Continue aspirin 81 mg daily and Plavix 75 mg daily for 3 weeks then continue just on monotherapy with aspirin  Continue high intensity atorvastatin  Continue neurochecks per protocol  No arrhythmias on telemetry, continue to monitor  Continue daily vitamin D supplementation  Outpatient referral to sleep medicine for CHARLEE evaluation  SLP following.  Continued modified diet, aspiration precautions   Continue PT/OT.  Up with assistance.  Fall precautions  DVT prophylaxis: Lovenox 40 mg daily    Disposition: [facility] referral pending    Discussed plan with RN.    DVT prophylaxis:  Medical and mechanical DVT prophylaxis orders are present.    CODE STATUS:   Code Status (Patient has no pulse and is not breathing): CPR (Attempt to Resuscitate)  Medical Interventions (Patient has pulse or is breathing): Full Support    Electronically signed by  Gaudencio Franz DO, 08/11/23, 9:53 AM EDT.

## 2023-08-11 NOTE — THERAPY TREATMENT NOTE
Acute Care - Physical Therapy Treatment Note   [facility]     Patient Name: Italo Pina  : 1960  MRN: [id number]  Today's Date: 2023      Visit Dx:     ICD-10-CM ICD-9-CM   1. Cerebrovascular accident (CVA), unspecified mechanism  I63.9 434.91   2. Hyperglycemia  R73.9 790.29   3. Hypertension, unspecified type  I10 401.9   4. Difficulty in walking  R26.2 719.7   5. Dysphagia, oropharyngeal  R13.12 787.22   6. Decreased activities of daily living (ADL)  Z78.9 V49.89     Patient Active Problem List   Diagnosis    CVA (cerebral vascular accident)     Past Medical History:   Diagnosis Date    Hypertension      Past Surgical History:   Procedure Laterality Date    KNEE CARTILAGE SURGERY Right      PT Assessment (last 12 hours)       PT Evaluation and Treatment       Row Name 23 1120          Physical Therapy Time and Intention    Subjective Information complains of;weakness;fatigue  -RH     Document Type therapy note (daily note)  -     Mode of Treatment physical therapy;individual therapy  -     Patient Effort adequate  -       Row Name 23 1120          Pain Scale: FACES Pre/Post-Treatment    Pain: FACES Scale, Pretreatment 0-->no hurt  -     Posttreatment Pain Rating 0-->no hurt  -       Row Name 23 1120          Strength (Manual Muscle Testing)    Strength (Manual Muscle Testing) --  Pt LLE strength grossly 3+/5.  -       Row Name 23 1120          Bed Mobility    Bed Mobility scooting/bridging;supine-sit  -     All Activities, [place] (Bed Mobility) contact guard;minimum assist (75% patient effort)  -     Scooting/Bridging [place] (Bed Mobility) contact guard;minimum assist (75% patient effort)  -     Supine-Sit [place] (Bed Mobility) contact guard;minimum assist (75% patient effort)  -     Assistive Device (Bed Mobility) bed rails  -     Comment, (Bed Mobility) Pt maintains sitting with CGA and RUE support.  -       Row Name 23  1120          Transfers    Transfers sit-stand transfer;stand-sit transfer  -       Row Name 08/11/23 1120          Sit-Stand Transfer    Sit-Stand [place] (Transfers) minimum assist (75% patient effort)  -     Assistive Device (Sit-Stand Transfers) walker, front-wheeled  -     Comment, (Sit-Stand Transfer) Pt using RUE to pull up on RW.  -       Row Name 08/11/23 1120          Stand-Sit Transfer    Stand-Sit [place] (Transfers) minimum assist (75% patient effort)  -     Assistive Device (Stand-Sit Transfers) walker, front-wheeled  -RH       Row Name 08/11/23 1120          Gait/Stairs (Locomotion)    Gait/Stairs Locomotion gait/ambulation independence;gait/ambulation assistive device;distance ambulated;gait pattern;gait deviations  -     [place] Level (Gait) minimum assist (75% patient effort)  -     Assistive Device (Gait) walker, front-wheeled  -     Distance in Feet (Gait) 40  -RH     Pattern (Gait) 3-point;step-to;step-through  -     Deviations/Abnormal Patterns (Gait) base of support, narrow  -RH     Left Sided Gait Deviations foot drop/toe drag;heel strike decreased  -     Gait Assessment/Intervention Pt amb with RW and min assist with 3 point step-through gait pattern with narrow base of support and intermittent drop foot.  Pt requires constant verbal feel to keep is feet apart and to limit length of steps.  -       Row Name 08/11/23 1120          Balance    Dynamic Standing Balance minimal assist  -RH     Position/Device Used, Standing Balance walker, front-wheeled  -RH       Row Name 08/11/23 1120          Vital Signs    Post Systolic BP Rehab 130  -RH     Post Treatment Diastolic   -[facility] Name 08/11/23 1120          Progress Summary (PT)    Progress Toward Functional Goals (PT) progress toward functional goals is fair  -RH               User Key  (r) = Recorded By, (t) = Taken By, (c) = Cosigned By      Initials Name Provider Type     Royal Franklin PTA  Physical Therapist Assistant                  Left Lower Extremity   Exercise  Reps  Sets    Short arc quads   10 2   Heel slides  10 2   Ankle pumps  10 2   Quad sets  10 2   Straight leg raise  10 2   Hip ab/adduction 10 2        Physical Therapy Education       Title: PT OT SLP Therapies (Done)       Topic: Physical Therapy (Done)       Point: Mobility training (Done)       Learning Progress Summary             Patient Acceptance, E,TB, VU by RADHA at 8/9/2023 0949                         Point: Precautions (Done)       Learning Progress Summary             Patient Acceptance, E,TB, VU by RADHA at 8/9/2023 0949                                         User Key       Initials Effective Dates Name Provider Type Discipline    RADHA 06/03/21 -  Steve Woodard, PT Physical Therapist PT                  PT Recommendation and Plan     Progress Summary (PT)  Progress Toward Functional Goals (PT): progress toward functional goals is fair   Outcome Measures       Row Name 08/11/23 1100 08/10/23 1500 08/09/23 0900       How much help from another person do you currently need...    Turning from your back to your side while in flat bed without using bedrails? 3  -RH 4  -RH 4  -RADHA    Moving from lying on back to sitting on the side of a flat bed without bedrails? 3  -RH 3  -RH 3  -RADHA    Moving to and from a bed to a chair (including a wheelchair)? 2  -RH 3  -RH 3  -RADHA    Standing up from a chair using your arms (e.g., wheelchair, bedside chair)? 2  -RH 3  -RH 3  -RADHA    Climbing 3-5 steps with a railing? 2  -RH 2  -RH 2  -RADHA    To walk in hospital room? 2  -RH 3  -RH 3  -RADHA    AM-PAC 6 Clicks Score (PT) 14  -RH 18  -RH 18  -RADHA       Functional Assessment    Outcome Measure Options -- -- AM-PAC 6 Clicks Basic Mobility (PT)  -RADHA              User Key  (r) = Recorded By, (t) = Taken By, (c) = Cosigned By      Initials Name Provider Type    Royal Solano, PTA Physical Therapist Assistant    Steve Vieira, PT Physical Therapist                      Time Calculation:    PT Charges       Row Name 08/11/23 1119             Time Calculation    PT Received On 08/11/23  -RH         Timed Charges    03313 - PT Therapeutic Exercise Minutes 24  -RH      01084 - Gait Training Minutes  7  -RH      69277 - PT Therapeutic Activity Minutes 7  -RH         Total Minutes    Timed Charges Total Minutes 38  -RH       Total Minutes 38  -RH                User Key  (r) = Recorded By, (t) = Taken By, (c) = Cosigned By      Initials Name Provider Type     Royal Franklin PTA Physical Therapist Assistant                  Therapy Charges for Today       Code Description Service Date Service Provider Modifiers Qty    46753627554 HC PT THER PROC EA 15 MIN 8/10/2023 Royal Franklin PTA GP 1    13053312164 HC GAIT TRAINING EA 15 MIN 8/10/2023 Royal Franklin, CORINNA GP 1    47202504605 HC PT THER PROC EA 15 MIN 8/11/2023 Royal Franklin PTA GP 2    50925504561 HC GAIT TRAINING EA 15 MIN 8/11/2023 Royal Franklin, CORINNA GP 1            PT G-Codes  Outcome Measure Options: AM-PAC 6 Clicks Daily Activity (OT), Optimal Instrument  AM-PAC 6 Clicks Score (PT): 14  AM-PAC 6 Clicks Score (OT): 12    Royal Franklin PTA  8/11/2023

## 2023-08-11 NOTE — PLAN OF CARE
Goal Outcome Evaluation:      Pt strength improving. Ambulated to the bathroom x 2 assist. Family is happy to see positive steps in the right direction. NIH 5.

## 2023-08-11 NOTE — PLAN OF CARE
Goal Outcome Evaluation:         No acute changes this shift. Family to remain at bedside. VSS.

## 2023-08-11 NOTE — THERAPY TREATMENT NOTE
Patient Name: Italo Pina  : 1960    MRN: [id number]                              Today's Date: 2023       Admit Date: 2023    Visit Dx:     ICD-10-CM ICD-9-CM   1. Cerebrovascular accident (CVA), unspecified mechanism  I63.9 434.91   2. Hyperglycemia  R73.9 790.29   3. Hypertension, unspecified type  I10 401.9   4. Difficulty in walking  R26.2 719.7   5. Dysphagia, oropharyngeal  R13.12 787.22   6. Decreased activities of daily living (ADL)  Z78.9 V49.89     Patient Active Problem List   Diagnosis    CVA (cerebral vascular accident)     Past Medical History:   Diagnosis Date    Hypertension      Past Surgical History:   Procedure Laterality Date    KNEE CARTILAGE SURGERY Right       General Information       Row Name 23 131          OT Time and Intention    Document Type therapy note (daily note)  -ES     Mode of Treatment individual therapy;occupational therapy  -ES       Row Name 23 131          General Information    Existing Precautions/Restrictions fall  -ES       Row Name 23          Cognition    Orientation Status (Cognition) oriented x 3  Patient cooperative, agreeable to therapy participation. Patient family present, encouraging.  -ES       Row Name 23          Safety Issues, Functional Mobility    Impairments Affecting Function (Mobility) balance;pain;strength;postural/trunk control;grasp;motor control;coordination  -ES               User Key  (r) = Recorded By, (t) = Taken By, (c) = Cosigned By      Initials Name Provider Type    ES Amanda Beaulieu, LEOR/L, CSRS Occupational Therapist                     Mobility/ADL's       Row Name 23          Bed Mobility    Bed Mobility supine-sit;sit-supine  -ES     Supine-Sit [place] (Bed Mobility) minimum assist (75% patient effort);1 person assist  -ES     Sit-Supine [place] (Bed Mobility) minimum assist (75% patient effort);1 person assist  -ES     Bed Mobility, Safety Issues decreased  use of arms for pushing/pulling;decreased use of legs for bridging/pushing  -ES               User Key  (r) = Recorded By, (t) = Taken By, (c) = Cosigned By      Initials Name Provider Type    Amanda Yarbrough OTR/L, CSRS Occupational Therapist                   Obj/Interventions       Row Name 08/11/23 1313          Shoulder (Therapeutic Exercise)    Shoulder (Therapeutic Exercise) AROM (active range of motion);strengthening exercise  -ES     Shoulder AROM (Therapeutic Exercise) sitting;left  Patient completes x 10 scapular elevation and depression, left internal/external rotation x 10, elevation to ~60ø x 10  -ES     Shoulder Strengthening (Therapeutic Exercise) left;sitting  Patient completes left internal/external rotation with 1lb wrist weight, shoulder flexion to ~60ø with 1 lb wrist weight x 10 each exercise  -ES       Row Name 08/11/23 1319          Elbow/Forearm (Therapeutic Exercise)    Elbow/Forearm (Therapeutic Exercise) strengthening exercise  -ES     Elbow/Forearm Strengthening (Therapeutic Exercise) left;flexion;extension;sitting;10 repetitions  with 1 lb wrist weight  -ES       Row Name 08/11/23 1319          Motor Skills    Motor Skills motor control/coordination interventions;neuro-muscular function  -ES     Motor Control/Coordination Interventions neuro-muscular re-education;occupation/activity based treatment  -ES     Therapeutic Exercise shoulder;elbow/forearm  -ES       Row Name 08/11/23 1319          Balance    Balance Assessment sitting dynamic balance;standing dynamic balance  -ES     Dynamic Sitting Balance standby assist  -ES     Position, Sitting Balance sitting edge of bed  -ES               User Key  (r) = Recorded By, (t) = Taken By, (c) = Cosigned By      Initials Name Provider Type    Amanda Yarbrough OTR/L, CSRS Occupational Therapist                   Goals/Plan    No documentation.                  Clinical Impression       Row Name 08/11/23 7162          Plan of Care Review     Plan of Care Reviewed With patient;family  -ES     Progress improving  -ES     Outcome Evaluation Patient with good participation in therapy session. Patient demonstrates increased left upper extremity strength this session, able to complete left internal and external rotation and beggings of shoulder flexion against gravity. Patient completes UE neuromuscular reeducation focused on return of function in LUE for transfers and independent ADL routine completion. Patient provided HEP to complete during non intervention times for carryover.  -ES               User Key  (r) = Recorded By, (t) = Taken By, (c) = Cosigned By      Initials Name Provider Type    Amanda Yarbrough, OTR/L, CSRS Occupational Therapist                   Outcome Measures       Row Name 08/11/23 1330          How much help from another is currently needed...    Putting on and taking off regular lower body clothing? 2  -ES     Bathing (including washing, rinsing, and drying) 2  -ES     Toileting (which includes using toilet bed pan or urinal) 2  -ES     Putting on and taking off regular upper body clothing 3  -ES     Taking care of personal grooming (such as brushing teeth) 2  -ES     Eating meals 2  -ES     AM-PAC 6 Clicks Score (OT) 13  -ES       Row Name 08/11/23 1100 08/11/23 0735       How much help from another person do you currently need...    Turning from your back to your side while in flat bed without using bedrails? 3  -RH 3  -CZ    Moving from lying on back to sitting on the side of a flat bed without bedrails? 3  -RH 3  -CZ    Moving to and from a bed to a chair (including a wheelchair)? 2  -RH 2  -CZ    Standing up from a chair using your arms (e.g., wheelchair, bedside chair)? 2  -RH 2  -CZ    Climbing 3-5 steps with a railing? 2  -RH 2  -CZ    To walk in hospital room? 2  -RH 2  -CZ    AM-PAC 6 Clicks Score (PT) 14  -RH 14  -CZ    Highest level of mobility 4 --> Transferred to chair/commode  -RH 4 --> Transferred to chair/commode   -CZ      Row Name 08/11/23 1333          Functional Assessment    Outcome Measure Options AM-PAC 6 Clicks Daily Activity (OT);Optimal Instrument  -ES       Row Name 08/11/23 6525          Optimal Instrument    Optimal Instrument Optimal - 3  -ES     Bending/Stooping 3  -ES     Standing 3  -ES     Reaching 3  -ES               User Key  (r) = Recorded By, (t) = Taken By, (c) = Cosigned By      Initials Name Provider Type    RH Royal Franklin, CORINNA Physical Therapist Assistant    Amanda Yarbrough, OTR/L, CSRS Occupational Therapist    Symone Bartholomew, RN Registered Nurse                      OT Recommendation and Plan  Planned Therapy Interventions (OT): activity tolerance training, BADL retraining, functional balance retraining, occupation/activity based interventions, patient/caregiver education/training, strengthening exercise, transfer/mobility retraining, neuromuscular control/coordination retraining, cognitive/visual perception retraining  Plan of Care Review  Plan of Care Reviewed With: patient, family  Progress: improving  Outcome Evaluation: Patient with good participation in therapy session. Patient demonstrates increased left upper extremity strength this session, able to complete left internal and external rotation and beggings of shoulder flexion against gravity. Patient completes UE neuromuscular reeducation focused on return of function in LUE for transfers and independent ADL routine completion. Patient provided HEP to complete during non intervention times for carryover.     Time Calculation:   Evaluation Complexity (OT)  Review Occupational Profile/Medical/Therapy History Complexity: brief/low complexity  Assessment, Occupational Performance/Identification of Deficit Complexity: 3-5 performance deficits  Clinical Decision Making Complexity (OT): problem focused assessment/low complexity  Overall Complexity of Evaluation (OT): low complexity     Time Calculation- OT       Row Name 08/11/23 9731  08/11/23 1119          Time Calculation- OT    OT Received On 08/11/23  -ES --     OT Goal Re-Cert Due Date 08/18/23  -ES --        Timed Charges    47371 -  OT Neuromuscular Reeducation Minutes 24  -ES --     94665 - Gait Training Minutes  -- 7  -RH        Total Minutes    Timed Charges Total Minutes 24  -ES 7  -RH      Total Minutes 24  -ES 7  -RH               User Key  (r) = Recorded By, (t) = Taken By, (c) = Cosigned By      Initials Name Provider Type     Royal Franklin, PTA Physical Therapist Assistant    Amanda Yarbrough, OTR/L, CSRS Occupational Therapist                  Therapy Charges for Today       Code Description Service Date Service Provider Modifiers Qty    81139435279 HC OT NEUROMUSC RE EDUCATION EA 15 MIN 8/10/2023 Amanda Beaulieu OTR/L, CSRS GO 2    01614425748 HC OT NEUROMUSC RE EDUCATION EA 15 MIN 8/11/2023 Amanda Beaulieu, OTR/L, CSRS GO 2                 SVETLANA Felder/L, CSRS  8/11/2023

## 2023-08-12 LAB
ANION GAP SERPL CALCULATED.3IONS-SCNC: 11.9 MMOL/L (ref 5–15)
BUN SERPL-MCNC: 24 MG/DL (ref 8–23)
BUN/CREAT SERPL: 20.5 (ref 7–25)
CALCIUM SPEC-SCNC: 10 MG/DL (ref 8.6–10.5)
CHLORIDE SERPL-SCNC: 100 MMOL/L (ref 98–107)
CO2 SERPL-SCNC: 25.1 MMOL/L (ref 22–29)
CREAT SERPL-MCNC: 1.17 MG/DL (ref 0.76–1.27)
EGFRCR SERPLBLD CKD-EPI 2021: 70 ML/MIN/1.73
GLUCOSE BLDC GLUCOMTR-MCNC: 188 MG/DL (ref 70–99)
GLUCOSE BLDC GLUCOMTR-MCNC: 232 MG/DL (ref 70–99)
GLUCOSE BLDC GLUCOMTR-MCNC: 233 MG/DL (ref 70–99)
GLUCOSE BLDC GLUCOMTR-MCNC: 283 MG/DL (ref 70–99)
GLUCOSE SERPL-MCNC: 120 MG/DL (ref 65–99)
POTASSIUM SERPL-SCNC: 3.6 MMOL/L (ref 3.5–5.2)
SODIUM SERPL-SCNC: 137 MMOL/L (ref 136–145)

## 2023-08-12 PROCEDURE — 63710000001 INSULIN REGULAR HUMAN PER 5 UNITS: Performed by: PHYSICIAN ASSISTANT

## 2023-08-12 PROCEDURE — 97116 GAIT TRAINING THERAPY: CPT

## 2023-08-12 PROCEDURE — 82948 REAGENT STRIP/BLOOD GLUCOSE: CPT

## 2023-08-12 PROCEDURE — 99232 SBSQ HOSP IP/OBS MODERATE 35: CPT | Performed by: INTERNAL MEDICINE

## 2023-08-12 PROCEDURE — 25010000002 ENOXAPARIN PER 10 MG: Performed by: INTERNAL MEDICINE

## 2023-08-12 PROCEDURE — 80048 BASIC METABOLIC PNL TOTAL CA: CPT | Performed by: INTERNAL MEDICINE

## 2023-08-12 PROCEDURE — 97110 THERAPEUTIC EXERCISES: CPT

## 2023-08-12 PROCEDURE — 63710000001 INSULIN DETEMIR PER 5 UNITS: Performed by: INTERNAL MEDICINE

## 2023-08-12 RX ORDER — BISACODYL 10 MG
10 SUPPOSITORY, RECTAL RECTAL DAILY PRN
Status: DISCONTINUED | OUTPATIENT
Start: 2023-08-12 | End: 2023-08-14 | Stop reason: HOSPADM

## 2023-08-12 RX ORDER — POLYETHYLENE GLYCOL 3350 17 G/17G
17 POWDER, FOR SOLUTION ORAL DAILY PRN
Status: DISCONTINUED | OUTPATIENT
Start: 2023-08-12 | End: 2023-08-14 | Stop reason: HOSPADM

## 2023-08-12 RX ORDER — BISACODYL 5 MG/1
5 TABLET, DELAYED RELEASE ORAL DAILY PRN
Status: DISCONTINUED | OUTPATIENT
Start: 2023-08-12 | End: 2023-08-14 | Stop reason: HOSPADM

## 2023-08-12 RX ORDER — AMOXICILLIN 250 MG
1 CAPSULE ORAL NIGHTLY PRN
Status: DISCONTINUED | OUTPATIENT
Start: 2023-08-12 | End: 2023-08-14 | Stop reason: HOSPADM

## 2023-08-12 RX ORDER — ONDANSETRON 4 MG/1
4 TABLET, ORALLY DISINTEGRATING ORAL EVERY 6 HOURS PRN
Status: DISCONTINUED | OUTPATIENT
Start: 2023-08-12 | End: 2023-08-14 | Stop reason: HOSPADM

## 2023-08-12 RX ADMIN — CLOPIDOGREL BISULFATE 75 MG: 75 TABLET ORAL at 08:34

## 2023-08-12 RX ADMIN — INSULIN DETEMIR 15 UNITS: 100 INJECTION, SOLUTION SUBCUTANEOUS at 21:25

## 2023-08-12 RX ADMIN — INSULIN HUMAN 2 UNITS: 100 INJECTION, SOLUTION PARENTERAL at 08:33

## 2023-08-12 RX ADMIN — INSULIN HUMAN 6 UNITS: 100 INJECTION, SOLUTION PARENTERAL at 21:25

## 2023-08-12 RX ADMIN — Medication 1000 UNITS: at 08:34

## 2023-08-12 RX ADMIN — Medication 10 ML: at 08:35

## 2023-08-12 RX ADMIN — INSULIN HUMAN 4 UNITS: 100 INJECTION, SOLUTION PARENTERAL at 17:50

## 2023-08-12 RX ADMIN — Medication 10 ML: at 21:26

## 2023-08-12 RX ADMIN — LISINOPRIL 20 MG: 20 TABLET ORAL at 08:34

## 2023-08-12 RX ADMIN — ATORVASTATIN CALCIUM 80 MG: 40 TABLET, FILM COATED ORAL at 21:25

## 2023-08-12 RX ADMIN — INSULIN HUMAN 4 UNITS: 100 INJECTION, SOLUTION PARENTERAL at 12:36

## 2023-08-12 RX ADMIN — HYDROCHLOROTHIAZIDE 25 MG: 25 TABLET ORAL at 08:34

## 2023-08-12 RX ADMIN — ENOXAPARIN SODIUM 40 MG: 100 INJECTION SUBCUTANEOUS at 08:34

## 2023-08-12 RX ADMIN — ASPIRIN 81 MG: 81 TABLET, CHEWABLE ORAL at 08:34

## 2023-08-12 NOTE — PLAN OF CARE
Goal Outcome Evaluation:    Pt has had a non incidental night.  VSS.  Wife at the bedside.

## 2023-08-12 NOTE — THERAPY TREATMENT NOTE
Acute Care - Physical Therapy Treatment Note   [facility]     Patient Name: Italo Pina  : 1960  MRN: [id number]  Today's Date: 2023      Visit Dx:     ICD-10-CM ICD-9-CM   1. Cerebrovascular accident (CVA), unspecified mechanism  I63.9 434.91   2. Hyperglycemia  R73.9 790.29   3. Hypertension, unspecified type  I10 401.9   4. Difficulty in walking  R26.2 719.7   5. Dysphagia, oropharyngeal  R13.12 787.22   6. Decreased activities of daily living (ADL)  Z78.9 V49.89     Patient Active Problem List   Diagnosis    CVA (cerebral vascular accident)     Past Medical History:   Diagnosis Date    Hypertension      Past Surgical History:   Procedure Laterality Date    KNEE CARTILAGE SURGERY Right      PT Assessment (last 12 hours)       PT Evaluation and Treatment       Row Name 23          Physical Therapy Time and Intention    Subjective Information no complaints  -TS     Document Type therapy note (daily note)  -TS     Mode of Treatment individual therapy;physical therapy  -TS       Row Name 23          Pain    Pretreatment Pain Rating 0/10 - no pain  -TS       Row Name 23          Cognition    Affect/Mental Status (Cognition) WFL  -TS       Row Name 23          Bed Mobility    Bed Mobility supine-sit;sit-supine  -TS     All Activities, [place] (Bed Mobility) contact guard;minimum assist (75% patient effort)  -TS     Scooting/Bridging [place] (Bed Mobility) contact guard;minimum assist (75% patient effort)  -TS     Supine-Sit [place] (Bed Mobility) minimum assist (75% patient effort);1 person assist  -TS     Sit-Supine [place] (Bed Mobility) minimum assist (75% patient effort);1 person assist  -TS     Bed Mobility, Safety Issues decreased use of arms for pushing/pulling;decreased use of legs for bridging/pushing  -TS     Assistive Device (Bed Mobility) bed rails;head of bed elevated  -TS       Row Name 23          Transfers     Transfers sit-stand transfer;stand-sit transfer  -TS       Row Name 08/12/23 0812          Sit-Stand Transfer    Sit-Stand [place] (Transfers) minimum assist (75% patient effort)  -TS     Assistive Device (Sit-Stand Transfers) walker, front-wheeled  -TS       Row Name 08/12/23 0812          Stand-Sit Transfer    Stand-Sit [place] (Transfers) minimum assist (75% patient effort)  -TS     Assistive Device (Stand-Sit Transfers) walker, front-wheeled  -TS       Row Name 08/12/23 0812          Gait/Stairs (Locomotion)    Gait/Stairs Locomotion gait/ambulation independence;gait/ambulation assistive device;distance ambulated;gait pattern;gait deviations  -TS     [place] Level (Gait) minimum assist (75% patient effort)  -TS     Assistive Device (Gait) walker, front-wheeled  -TS     Distance in Feet (Gait) 40  -TS     Pattern (Gait) 3-point;step-to;step-through  -TS     Deviations/Abnormal Patterns (Gait) base of support, narrow  -TS     Bilateral Gait Deviations heel strike decreased;foot drop/toe drag  -TS     Left Sided Gait Deviations foot drop/toe drag;heel strike decreased;leans left  -TS       Row Name 08/12/23 0812          Safety Issues, Functional Mobility    Impairments Affecting Function (Mobility) balance;strength;postural/trunk control;grasp;motor control;coordination  -TS       Row Name 08/12/23 0812          Motor Skills    Therapeutic Exercise hip;knee;ankle  AAROM BLE x15 reps, seated position  -TS       Row Name 08/12/23 0812          Positioning and Restraints    Pre-Treatment Position in bed  -TS     Post Treatment Position bed  -TS     In Bed supine;call light within reach;exit alarm on;with family/caregiver;heels elevated  -TS       Row Name 08/12/23 0812          Progress Summary (PT)    Progress Toward Functional Goals (PT) progress toward functional goals is fair  -TS               User Key  (r) = Recorded By, (t) = Taken By, (c) = Cosigned By      Initials Name Provider Type    TS  Satnam Gomez, CORINNA Physical Therapist Assistant                Bilateral Lower Extremity   Exercise  Reps  Sets    Long arc quads   15 1   Hip ab/adduction 15 1   Heel/toe raises 15 1   Marches  15 1                    Physical Therapy Education       Title: PT OT SLP Therapies (Done)       Topic: Physical Therapy (Done)       Point: Mobility training (Done)       Learning Progress Summary             Patient Acceptance, E,TB, VU by RADHA at 8/9/2023 0949                         Point: Precautions (Done)       Learning Progress Summary             Patient Acceptance, E,TB, VU by RADHA at 8/9/2023 0949                                         User Key       Initials Effective Dates Name Provider Type Discipline    RADHA 06/03/21 -  Steve Woodard, PT Physical Therapist PT                  PT Recommendation and Plan     Progress Summary (PT)  Progress Toward Functional Goals (PT): progress toward functional goals is fair   Outcome Measures       Row Name 08/12/23 0817 08/11/23 1100 08/10/23 1500       How much help from another person do you currently need...    Turning from your back to your side while in flat bed without using bedrails? 3  -TS 3  -RH 4  -RH    Moving from lying on back to sitting on the side of a flat bed without bedrails? 3  -TS 3  -RH 3  -RH    Moving to and from a bed to a chair (including a wheelchair)? 2  -TS 2  -RH 3  -RH    Standing up from a chair using your arms (e.g., wheelchair, bedside chair)? 2  -TS 2  -RH 3  -RH    Climbing 3-5 steps with a railing? 2  -TS 2  -RH 2  -RH    To walk in hospital room? 2  -TS 2  -RH 3  -RH    AM-PAC 6 Clicks Score (PT) 14  -TS 14  -RH 18  -RH      Row Name 08/09/23 0900             How much help from another person do you currently need...    Turning from your back to your side while in flat bed without using bedrails? 4  -RADHA      Moving from lying on back to sitting on the side of a flat bed without bedrails? 3  -RADHA      Moving to and from a bed to a chair  (including a wheelchair)? 3  -RADHA      Standing up from a chair using your arms (e.g., wheelchair, bedside chair)? 3  -RADHA      Climbing 3-5 steps with a railing? 2  -RADHA      To walk in hospital room? 3  -RADHA      AM-PAC 6 Clicks Score (PT) 18  -RADHA         Functional Assessment    Outcome Measure Options AM-PAC 6 Clicks Basic Mobility (PT)  -RADHA                User Key  (r) = Recorded By, (t) = Taken By, (c) = Cosigned By      Initials Name Provider Type    Satnam Escudero, CORINNA Physical Therapist Assistant    Royal Solano, PTA Physical Therapist Assistant    Steve Vieira, PT Physical Therapist                     Time Calculation:    PT Charges       Row Name 08/12/23 0810             Time Calculation    PT Received On 08/12/23  -TS      PT Goal Re-Cert Due Date 08/18/23  -TS         Timed Charges    58820 - PT Therapeutic Exercise Minutes 18  -TS      71990 - Gait Training Minutes  7  -TS      46747 - PT Therapeutic Activity Minutes 4  -TS         Total Minutes    Timed Charges Total Minutes 29  -TS       Total Minutes 29  -TS                User Key  (r) = Recorded By, (t) = Taken By, (c) = Cosigned By      Initials Name Provider Type    TS Satnam Gomez PTA Physical Therapist Assistant                  Therapy Charges for Today       Code Description Service Date Service Provider Modifiers Qty    77051891872 HC PT THER PROC EA 15 MIN 8/12/2023 Satnam Gomez, CORINNA GP 1    71082760154 HC GAIT TRAINING EA 15 MIN 8/12/2023 Satnam Gomez PTA GP 1            PT G-Codes  Outcome Measure Options: AM-PAC 6 Clicks Daily Activity (OT), Optimal Instrument  AM-PAC 6 Clicks Score (PT): 14  AM-PAC 6 Clicks Score (OT): 13    Satnam Gomez PTA  8/12/2023

## 2023-08-12 NOTE — PLAN OF CARE
Problem: Fall Injury Risk  Goal: Absence of Fall and Fall-Related Injury  Outcome: Ongoing, Progressing  Intervention: Promote Injury-Free Environment   Goal Outcome Evaluation:  Plan of Care Reviewed With: patient        Progress: no change  Outcome Evaluation: Patient managed to get to side of bed with one assist, could only stand for a few seconds. Patient's speech has improved some since the begining of the shift.  Family has remained at bedside. No questions or concerns at this time. Will continue to monitor, call light in reach.

## 2023-08-12 NOTE — PROGRESS NOTES
[facility]   Hospitalist Progress Note  Date: 2023  Patient Name: Italo Pina  : 1960  MRN: [id number]  Date of admission: 2023      Subjective   Subjective     Chief Complaint: Follow up for slurred speech and trouble swallowing    Summary: 63-year-old male, no reported past medical history who presented with acute episode of trouble swallowing and slurred speech.  Did not meet criteria for thrombolytics.  Blood pressure very high on presentation.  Teleneurology consulted. CT head without contrast did not report acute infarct or hemorrhage.  CT cerebral perfusion study no core infarction noted.  CT angiogram head and neck no large vessel occlusion or hemodynamically significant stenoses but there was diffuse atherosclerotic changes and narrowing noted within the cavernous segments of the internal carotid arteries bilaterally.  Labs notable for glucose 425 and calcium 10.9.  MRI brain with acute/subacute infarct in the right kenia.  Hemoglobin A1c greater than 13, initiated on insulin.  On aspirin and Plavix, high intensity statin.  Antihypertensive agents added.  Rehab planned.    Interval Followup:   No events overnight.  Blood pressure trend much improved.  Had some nausea this morning.  No vomiting or abdominal pain.  Last bowel movement yesterday.  Not much of an appetite, does not like the food otherwise no complaints    Review of Systems  Respiratory:  No Cough, No Dyspnea  Gastrointestinal:  No Nausea, No Vomiting  Neuro: +Weakness LUE otherwise no complaints      Objective   Objective     Vitals:   Temp:  [97.5 øF (36.4 øC)-98.6 øF (37 øC)] 97.5 øF (36.4 øC)  Heart Rate:  [83-84] 84  Resp:  [16-18] 16  BP: (123-142)/(81-89) 142/81  Physical Exam    Constitutional: Awake, conversant, NAD   Eyes: Pupils equal and reactive, no conjunctival injections   HENT: NCAT, nares patent, MMM   Respiratory: Clear to auscultation bilaterally, nonlabored respirations    Cardiovascular: RRR, no  murmurs, no edema   Gastrointestinal: Positive bowel sounds, soft, nontender, nondistended   Neurologic: Alert, Cranial Nerves grossly intact, left upper extremity weakness   Skin: Extremities warm, no rashes or wounds    Result Review    Result Review:  I have personally reviewed the following over the last 24 hours (07:00 to 07:00) and agree with the following findings  [x]  Laboratory  CBC          8/8/2023    12:33   CBC   WBC 6.34    RBC 4.77    Hemoglobin 14.7    Hematocrit 40.7    MCV 85.3    MCH 30.8    MCHC 36.1    RDW 12.7    Platelets 192      Lipid Panel          8/9/2023    04:27   Lipid Panel   Total Cholesterol 246    Triglycerides 251    HDL Cholesterol 46    VLDL Cholesterol 46    LDL Cholesterol  154    LDL/HDL Ratio 3.26      Hemoglobin A1c 13.60    Vitamin D OH 20.6    []  Microbiology  []  Radiology  [x]  EKG/Telemetry monitor personally reviewed: NSR, no events  []  Cardiology/Vascular   []  Pathology  []  Old records  [x]  Other:    Intake/Output Summary (Last 24 hours) at 8/12/2023 1353  Last data filed at 8/12/2023 1300  Gross per 24 hour   Intake 580 ml   Output 675 ml   Net -95 ml         Assessment & Plan   Assessment / Plan     Assessment/Plan:  HTN - controlled  Nausea  Acute/subacute CVA involving right kenia  Chronic small vessel ischemic changes involving basal ganglia   Uncontrolled diabetes, likely type II, new diagnosis  Hyperlipidemia  Vitamin D insufficiency  Hypokalemia, resolved      Blood pressure improving.  Potassium normal and creatinine stable.  Continue Lisinopril 20 mg daily and HCTZ 25 mg daily.  Trend renal function and electrolytes with daily BMP  Blood glucose trend improved.  Continue Levemir 15 units daily.  Continue moderate dose SSI per protocol. Goal glucose 140-180.  Add metformin on discharge.  Education on diabetic diet and insulin teaching provided.  Continue aspirin 81 mg daily and Plavix 75 mg daily for 3 weeks then continue just on monotherapy with  aspirin  Continue high intensity atorvastatin  Continue neurochecks per protocol  No arrhythmias on telemetry, continue to monitor  Antiemetics prn. Monitor response  Continue daily vitamin D supplementation  Outpatient referral to sleep medicine for CHARLEE evaluation  SLP following.  Continued modified diet, aspiration precautions   Continue PT/OT.  Up with assistance.  Fall precautions  DVT prophylaxis: Lovenox 40 mg daily    Disposition: [facility] pending bed availability    Discussed plan with RN.    DVT prophylaxis:  Medical and mechanical DVT prophylaxis orders are present.    CODE STATUS:   Code Status (Patient has no pulse and is not breathing): CPR (Attempt to Resuscitate)  Medical Interventions (Patient has pulse or is breathing): Full Support    Electronically signed by Gaudencio Franz DO, 08/12/23, 1:55 PM EDT.

## 2023-08-13 LAB
ANION GAP SERPL CALCULATED.3IONS-SCNC: 11.5 MMOL/L (ref 5–15)
BUN SERPL-MCNC: 30 MG/DL (ref 8–23)
BUN/CREAT SERPL: 23.4 (ref 7–25)
CALCIUM SPEC-SCNC: 10.1 MG/DL (ref 8.6–10.5)
CHLORIDE SERPL-SCNC: 101 MMOL/L (ref 98–107)
CO2 SERPL-SCNC: 24.5 MMOL/L (ref 22–29)
CREAT SERPL-MCNC: 1.28 MG/DL (ref 0.76–1.27)
EGFRCR SERPLBLD CKD-EPI 2021: 62.9 ML/MIN/1.73
GLUCOSE BLDC GLUCOMTR-MCNC: 147 MG/DL (ref 70–99)
GLUCOSE BLDC GLUCOMTR-MCNC: 189 MG/DL (ref 70–99)
GLUCOSE BLDC GLUCOMTR-MCNC: 239 MG/DL (ref 70–99)
GLUCOSE BLDC GLUCOMTR-MCNC: 272 MG/DL (ref 70–99)
GLUCOSE SERPL-MCNC: 130 MG/DL (ref 65–99)
POTASSIUM SERPL-SCNC: 3.5 MMOL/L (ref 3.5–5.2)
SODIUM SERPL-SCNC: 137 MMOL/L (ref 136–145)

## 2023-08-13 PROCEDURE — 63710000001 INSULIN REGULAR HUMAN PER 5 UNITS: Performed by: PHYSICIAN ASSISTANT

## 2023-08-13 PROCEDURE — 25010000002 ENOXAPARIN PER 10 MG: Performed by: INTERNAL MEDICINE

## 2023-08-13 PROCEDURE — 97116 GAIT TRAINING THERAPY: CPT

## 2023-08-13 PROCEDURE — 82948 REAGENT STRIP/BLOOD GLUCOSE: CPT

## 2023-08-13 PROCEDURE — 97110 THERAPEUTIC EXERCISES: CPT

## 2023-08-13 PROCEDURE — 80048 BASIC METABOLIC PNL TOTAL CA: CPT | Performed by: INTERNAL MEDICINE

## 2023-08-13 PROCEDURE — 99232 SBSQ HOSP IP/OBS MODERATE 35: CPT | Performed by: INTERNAL MEDICINE

## 2023-08-13 PROCEDURE — 97530 THERAPEUTIC ACTIVITIES: CPT

## 2023-08-13 PROCEDURE — 63710000001 INSULIN DETEMIR PER 5 UNITS: Performed by: INTERNAL MEDICINE

## 2023-08-13 RX ADMIN — ATORVASTATIN CALCIUM 80 MG: 40 TABLET, FILM COATED ORAL at 21:12

## 2023-08-13 RX ADMIN — Medication 1000 UNITS: at 09:08

## 2023-08-13 RX ADMIN — INSULIN DETEMIR 15 UNITS: 100 INJECTION, SOLUTION SUBCUTANEOUS at 21:21

## 2023-08-13 RX ADMIN — CLOPIDOGREL BISULFATE 75 MG: 75 TABLET ORAL at 09:07

## 2023-08-13 RX ADMIN — ENOXAPARIN SODIUM 40 MG: 100 INJECTION SUBCUTANEOUS at 09:07

## 2023-08-13 RX ADMIN — LISINOPRIL 20 MG: 20 TABLET ORAL at 09:08

## 2023-08-13 RX ADMIN — ASPIRIN 81 MG: 81 TABLET, CHEWABLE ORAL at 09:08

## 2023-08-13 RX ADMIN — POLYETHYLENE GLYCOL 3350 17 G: 17 POWDER, FOR SOLUTION ORAL at 09:30

## 2023-08-13 RX ADMIN — Medication 10 ML: at 09:08

## 2023-08-13 RX ADMIN — Medication 10 ML: at 21:19

## 2023-08-13 RX ADMIN — HYDROCHLOROTHIAZIDE 25 MG: 25 TABLET ORAL at 09:08

## 2023-08-13 RX ADMIN — INSULIN HUMAN 4 UNITS: 100 INJECTION, SOLUTION PARENTERAL at 21:13

## 2023-08-13 RX ADMIN — INSULIN HUMAN 2 UNITS: 100 INJECTION, SOLUTION PARENTERAL at 17:33

## 2023-08-13 RX ADMIN — INSULIN HUMAN 6 UNITS: 100 INJECTION, SOLUTION PARENTERAL at 12:18

## 2023-08-13 NOTE — PROGRESS NOTES
[facility]   Hospitalist Progress Note  Date: 2023  Patient Name: Italo Pina  : 1960  MRN: [id number]  Date of admission: 2023      Subjective   Subjective     Chief Complaint: Follow up for slurred speech and trouble swallowing    Summary: 63-year-old male, no reported past medical history who presented with acute episode of trouble swallowing and slurred speech.  Did not meet criteria for thrombolytics.  Blood pressure very high on presentation.  Teleneurology consulted. CT head without contrast did not report acute infarct or hemorrhage.  CT cerebral perfusion study no core infarction noted.  CT angiogram head and neck no large vessel occlusion or hemodynamically significant stenoses but there was diffuse atherosclerotic changes and narrowing noted within the cavernous segments of the internal carotid arteries bilaterally.  Labs notable for glucose 425 and calcium 10.9.  MRI brain with acute/subacute infarct in the right kenia.  Hemoglobin A1c greater than 13, initiated on insulin  On aspirin and Plavix, high intensity statin.  Antihypertensive agents added with good effect.  Awaiting rehab    Interval Followup:   No further nausea.  Tolerating modified diet.  Feeling fine.  No new complaints    Review of Systems  Respiratory:  No Cough, No Dyspnea  Gastrointestinal:  No Nausea, No Vomiting, no abdominal pain  Neuro: +Weakness LUE (improving) otherwise denied complaints    Objective   Objective     Vitals:   Temp:  [97.5 øF (36.4 øC)-98.2 øF (36.8 øC)] 98.2 øF (36.8 øC)  Heart Rate:  [84-93] 85  Resp:  [16-18] 18  BP: (110-142)/(77-88) 140/87  Physical Exam    Constitutional: Awake, conversant, NAD   Eyes: Pupils equal and reactive, no conjunctival injections   HENT: NCAT, nares patent, MMM   Respiratory: Clear to auscultation bilaterally, nonlabored respirations    Cardiovascular: RRR, no murmurs, no edema   Gastrointestinal: Positive bowel sounds, soft, nontender,  nondistended   Neurologic: Alert, Cranial Nerves grossly intact, left upper extremity weakness   Skin: Extremities warm, no rashes or wounds    Result Review    Result Review:  I have personally reviewed the following over the last 24 hours (07:00 to 07:00) and agree with the following findings  [x]  Laboratory  CBC          8/8/2023    12:33   CBC   WBC 6.34    RBC 4.77    Hemoglobin 14.7    Hematocrit 40.7    MCV 85.3    MCH 30.8    MCHC 36.1    RDW 12.7    Platelets 192      Lipid Panel          8/9/2023    04:27   Lipid Panel   Total Cholesterol 246    Triglycerides 251    HDL Cholesterol 46    VLDL Cholesterol 46    LDL Cholesterol  154    LDL/HDL Ratio 3.26      Hemoglobin A1c 13.60    Vitamin D OH 20.6    []  Microbiology  []  Radiology  [x]  EKG/Telemetry monitor personally reviewed: NSR, no events  []  Cardiology/Vascular   []  Pathology  []  Old records  [x]  Other:    Intake/Output Summary (Last 24 hours) at 8/13/2023 1014  Last data filed at 8/13/2023 0901  Gross per 24 hour   Intake 460 ml   Output 900 ml   Net -440 ml         Assessment & Plan   Assessment / Plan     Assessment/Plan:  HTN - controlled  Nausea, resolved  Acute/subacute CVA involving right kenia  Chronic small vessel ischemic changes involving basal ganglia   Uncontrolled diabetes, likely type II, new diagnosis  Hyperlipidemia  Vitamin D insufficiency  Hypokalemia, resolved      Blood pressure at goal.  Creatinine a little up, not unexpected given addition of new medications.  Continue Lisinopril 20 mg daily and HCTZ 25 mg daily.  Trend renal function and electrolytes with daily BMP  Blood glucose at goal.  Continue Levemir 15 units daily.  Continue moderate dose SSI per protocol. Goal glucose 140-180.  Add metformin on discharge.  Education on diabetic diet and insulin teaching provided.  Continue aspirin 81 mg daily and Plavix 75 mg daily for 3 weeks then continue just on monotherapy with aspirin  Continue high intensity  atorvastatin  Continue neurochecks per protocol  No arrhythmias on telemetry, continue to monitor  Antiemetics prn. Monitor response  Continue daily vitamin D supplementation  Outpatient referral to sleep medicine for CHARLEE evaluation  SLP following.  Continued modified diet, aspiration precautions   Continue PT/OT.  Up with assistance.  Fall precautions  DVT prophylaxis: Lovenox 40 mg daily    Disposition: [facility] pending bed availability    Discussed plan with RN.    DVT prophylaxis:  Medical and mechanical DVT prophylaxis orders are present.    CODE STATUS:   Code Status (Patient has no pulse and is not breathing): CPR (Attempt to Resuscitate)  Medical Interventions (Patient has pulse or is breathing): Full Support    Electronically signed by Gaudencio Franz DO, 08/13/23, 10:15 AM EDT.

## 2023-08-13 NOTE — THERAPY TREATMENT NOTE
Acute Care - Physical Therapy Progress Note   [place]     Patient Name: Italo Pina  : 1960  MRN: [id number]  Today's Date: 2023      Visit Dx:     ICD-10-CM ICD-9-CM   1. Cerebrovascular accident (CVA), unspecified mechanism  I63.9 434.91   2. Hyperglycemia  R73.9 790.29   3. Hypertension, unspecified type  I10 401.9   4. Difficulty in walking  R26.2 719.7   5. Dysphagia, oropharyngeal  R13.12 787.22   6. Decreased activities of daily living (ADL)  Z78.9 V49.89     Patient Active Problem List   Diagnosis    CVA (cerebral vascular accident)     Past Medical History:   Diagnosis Date    Hypertension      Past Surgical History:   Procedure Laterality Date    KNEE CARTILAGE SURGERY Right      PT Assessment (last 12 hours)       PT Evaluation and Treatment       Row Name 23 1200          Physical Therapy Time and Intention    Subjective Information no complaints  -CS     Document Type therapy note (daily note)  -CS     Mode of Treatment individual therapy;physical therapy  -CS     Patient Effort good  -CS     Symptoms Noted During/After Treatment fatigue  -       Row Name 23 1200          Bed Mobility    Supine-Sit Ritchie (Bed Mobility) verbal cues;minimum assist (75% patient effort);1 person assist  -CS     Bed Mobility, Safety Issues decreased use of arms for pushing/pulling;decreased use of legs for bridging/pushing  -CS     Assistive Device (Bed Mobility) bed rails;head of bed elevated  -       Row Name 23 1200          Sit-Stand Transfer    Sit-Stand Ritchie (Transfers) verbal cues;minimum assist (75% patient effort);1 person assist  -CS     Assistive Device (Sit-Stand Transfers) walker, front-wheeled  -       Row Name 23 1200          Stand-Sit Transfer    Stand-Sit Ritchie (Transfers) verbal cues;minimum assist (75% patient effort);1 person assist  -CS     Assistive Device (Stand-Sit Transfers) walker, front-wheeled  -       Row Name 23  1200          Gait/Stairs (Locomotion)    [place] Level (Gait) verbal cues;minimum assist (75% patient effort);1 person assist  -     Assistive Device (Gait) walker, front-wheeled  L hand was attached to handle of Rw using coban  -     Distance in Feet (Gait) 60  -CS     Pattern (Gait) 4-point;step-to;step-through  -     Deviations/Abnormal Patterns (Gait) base of support, narrow;festinating/shuffling;gait speed decreased;stride length decreased  -     Left Sided Gait Deviations heel strike decreased;weight shift ability decreased;decreased knee extension  -       Row Name 08/13/23 1200          Motor Skills    Therapeutic Exercise shoulder;elbow/forearm;wrist;hand;hip;knee;ankle  -       Row Name 08/13/23 1200          Shoulder (Therapeutic Exercise)    Shoulder AAROM (Therapeutic Exercise) left;sitting;10 repetitions;aBduction;aDduction;extension;flexion;external rotation;internal rotation;scapular elevation;scapular retraction  -       Row Name 08/13/23 1200          Elbow/Forearm (Therapeutic Exercise)    Elbow/Forearm (Therapeutic Exercise) AAROM (active assistive range of motion)  -     Elbow/Forearm AAROM (Therapeutic Exercise) left;sitting;pronation;supination;extension;flexion;10 repetitions  -       Row Name 08/13/23 1200          Wrist (Therapeutic Exercise)    Wrist (Therapeutic Exercise) AAROM (active assistive range of motion)  -     Wrist AAROM (Therapeutic Exercise) left;flexion;extension;10 repetitions  -       Row Name 08/13/23 1200          Hand (Therapeutic Exercise)    Hand (Therapeutic Exercise) strengthening exercise  -     Hand Strengthening (Therapeutic Exercise) left;finger flexion;finger extension;thumb flexion;thumb extension;10 repetitions  -       Row Name 08/13/23 1200          Hip (Therapeutic Exercise)    Hip (Therapeutic Exercise) AAROM (active assistive range of motion)  -     Hip AAROM (Therapeutic Exercise)  left;flexion;extension;aBduction;aDduction;sitting;10 repetitions  -       Row Name 08/13/23 1200          Knee (Therapeutic Exercise)    Knee (Therapeutic Exercise) AAROM (active assistive range of motion)  -     Knee AAROM (Therapeutic Exercise) left;flexion;extension;sitting;10 repetitions  -       Row Name 08/13/23 1200          Ankle (Therapeutic Exercise)    Ankle (Therapeutic Exercise) AROM (active range of motion)  -     Ankle AROM (Therapeutic Exercise) bilateral;dorsiflexion;plantarflexion;sitting;10 repetitions  -       Row Name 08/13/23 1200          Neuromuscular Re-education    Interventions (Neuromuscular Re-education) joint approximation;weight bearing  L hand was wrapped to the Rw using Coban and support was then provided to L elbow to allow pt to weightbear through L UE with gait training.  -       Row Name 08/13/23 1200          Positioning and Restraints    Pre-Treatment Position in bed  -CS     Post Treatment Position chair  -CS     In Chair sitting;call light within reach;encouraged to call for assist;exit alarm on;with family/caregiver  -       Row Name 08/13/23 1200          Progress Summary (PT)    Progress Toward Functional Goals (PT) progress toward functional goals is good  -               User Key  (r) = Recorded By, (t) = Taken By, (c) = Cosigned By      Initials Name Provider Type    James Watson PTA Physical Therapist Assistant                    Physical Therapy Education       Title: PT OT SLP Therapies (Done)       Topic: Physical Therapy (Done)       Point: Mobility training (Done)       Learning Progress Summary             Patient Acceptance, E,TB, VU by RADHA at 8/9/2023 0949                         Point: Precautions (Done)       Learning Progress Summary             Patient Acceptance, E,TB, VU by RADHA at 8/9/2023 0949                                         User Key       Initials Effective Dates Name Provider Type Maritza GARCIA 06/03/21 -  Vance  Steve NORTH, PT Physical Therapist PT                  PT Recommendation and Plan     Progress Summary (PT)  Progress Toward Functional Goals (PT): progress toward functional goals is good   Outcome Measures       Row Name 08/13/23 1300 08/12/23 0817 08/11/23 1100       How much help from another person do you currently need...    Turning from your back to your side while in flat bed without using bedrails? 3  -CS 3  -TS 3  -RH    Moving from lying on back to sitting on the side of a flat bed without bedrails? 3  -CS 3  -TS 3  -RH    Moving to and from a bed to a chair (including a wheelchair)? 2  -CS 2  -TS 2  -RH    Standing up from a chair using your arms (e.g., wheelchair, bedside chair)? 3  -CS 2  -TS 2  -RH    Climbing 3-5 steps with a railing? 1  -CS 2  -TS 2  -RH    To walk in hospital room? 2  -CS 2  -TS 2  -RH    AM-PAC 6 Clicks Score (PT) 14  -CS 14  -TS 14  -RH       Functional Assessment    Outcome Measure Options AM-PAC 6 Clicks Basic Mobility (PT)  -CS -- --      Row Name 08/10/23 1500             How much help from another person do you currently need...    Turning from your back to your side while in flat bed without using bedrails? 4  -RH      Moving from lying on back to sitting on the side of a flat bed without bedrails? 3  -RH      Moving to and from a bed to a chair (including a wheelchair)? 3  -RH      Standing up from a chair using your arms (e.g., wheelchair, bedside chair)? 3  -RH      Climbing 3-5 steps with a railing? 2  -RH      To walk in hospital room? 3  -RH      AM-PAC 6 Clicks Score (PT) 18  -RH                User Key  (r) = Recorded By, (t) = Taken By, (c) = Cosigned By      Initials Name Provider Type    Satnam Escudero PTA Physical Therapist Assistant    Royal Solano PTA Physical Therapist Assistant    James Watson PTA Physical Therapist Assistant                     Time Calculation:    PT Charges       Row Name 08/13/23 5002             Time Calculation    Start  Time 1140  -CS      PT Received On 08/13/23  -CS         Timed Charges    16361 - PT Therapeutic Exercise Minutes 26  -CS      90152 - Gait Training Minutes  16  -CS      31136 - PT Therapeutic Activity Minutes 12  -CS         Total Minutes    Timed Charges Total Minutes 54  -CS       Total Minutes 54  -CS                User Key  (r) = Recorded By, (t) = Taken By, (c) = Cosigned By      Initials Name Provider Type     James Henderson PTA Physical Therapist Assistant                  Therapy Charges for Today       Code Description Service Date Service Provider Modifiers Qty    01061806119 HC PT THER PROC EA 15 MIN 8/13/2023 James Henderson PTA GP 2    16484112000 HC GAIT TRAINING EA 15 MIN 8/13/2023 James Henderson PTA GP 1    95660684461 HC PT THERAPEUTIC ACT EA 15 MIN 8/13/2023 James Henderson PTA GP 1            PT G-Codes  Outcome Measure Options: AM-PAC 6 Clicks Basic Mobility (PT)  AM-PAC 6 Clicks Score (PT): 14  AM-PAC 6 Clicks Score (OT): 13    James Henderson PTA  8/13/2023

## 2023-08-13 NOTE — PLAN OF CARE
Problem: Fall Injury Risk  Goal: Absence of Fall and Fall-Related Injury  Outcome: Ongoing, Progressing  Intervention: Promote Injury-Free Environment   Goal Outcome Evaluation:  Plan of Care Reviewed With: patient        Progress: improving  Outcome Evaluation: Patient has showed some improvement today, he was able to stand on side of bed to use urinal with one assist. Patient also ambulated to the door with physical therapy, tolerated well, used walker and one assist. Family has remained at bedside, all questions and concerns addressed. Will continue to monitor, call light in reach.

## 2023-08-14 VITALS
RESPIRATION RATE: 18 BRPM | DIASTOLIC BLOOD PRESSURE: 90 MMHG | OXYGEN SATURATION: 96 % | HEIGHT: 66 IN | HEART RATE: 74 BPM | TEMPERATURE: 98.1 F | WEIGHT: 163.36 LBS | BODY MASS INDEX: 26.25 KG/M2 | SYSTOLIC BLOOD PRESSURE: 132 MMHG

## 2023-08-14 LAB
ANION GAP SERPL CALCULATED.3IONS-SCNC: 10.6 MMOL/L (ref 5–15)
BUN SERPL-MCNC: 36 MG/DL (ref 8–23)
BUN/CREAT SERPL: 26.1 (ref 7–25)
CALCIUM SPEC-SCNC: 9.8 MG/DL (ref 8.6–10.5)
CHLORIDE SERPL-SCNC: 101 MMOL/L (ref 98–107)
CO2 SERPL-SCNC: 24.4 MMOL/L (ref 22–29)
CREAT SERPL-MCNC: 1.38 MG/DL (ref 0.76–1.27)
EGFRCR SERPLBLD CKD-EPI 2021: 57.5 ML/MIN/1.73
GLUCOSE BLDC GLUCOMTR-MCNC: 166 MG/DL (ref 70–99)
GLUCOSE BLDC GLUCOMTR-MCNC: 214 MG/DL (ref 70–99)
GLUCOSE SERPL-MCNC: 186 MG/DL (ref 65–99)
POTASSIUM SERPL-SCNC: 4.1 MMOL/L (ref 3.5–5.2)
SODIUM SERPL-SCNC: 136 MMOL/L (ref 136–145)

## 2023-08-14 PROCEDURE — 97110 THERAPEUTIC EXERCISES: CPT

## 2023-08-14 PROCEDURE — 97116 GAIT TRAINING THERAPY: CPT

## 2023-08-14 PROCEDURE — 63710000001 INSULIN REGULAR HUMAN PER 5 UNITS: Performed by: PHYSICIAN ASSISTANT

## 2023-08-14 PROCEDURE — 82948 REAGENT STRIP/BLOOD GLUCOSE: CPT

## 2023-08-14 PROCEDURE — 99239 HOSP IP/OBS DSCHRG MGMT >30: CPT | Performed by: INTERNAL MEDICINE

## 2023-08-14 PROCEDURE — 25010000002 ENOXAPARIN PER 10 MG: Performed by: INTERNAL MEDICINE

## 2023-08-14 PROCEDURE — 80048 BASIC METABOLIC PNL TOTAL CA: CPT | Performed by: INTERNAL MEDICINE

## 2023-08-14 PROCEDURE — 92526 ORAL FUNCTION THERAPY: CPT

## 2023-08-14 PROCEDURE — 97112 NEUROMUSCULAR REEDUCATION: CPT

## 2023-08-14 RX ORDER — ATORVASTATIN CALCIUM 80 MG/1
80 TABLET, FILM COATED ORAL NIGHTLY
Qty: 30 TABLET | Refills: 1
Start: 2023-08-14 | End: 2023-10-13

## 2023-08-14 RX ORDER — ASPIRIN 81 MG/1
81 TABLET, CHEWABLE ORAL DAILY
Qty: 30 TABLET | Refills: 1
Start: 2023-08-15 | End: 2023-10-14

## 2023-08-14 RX ORDER — CLOPIDOGREL BISULFATE 75 MG/1
75 TABLET ORAL DAILY
Start: 2023-08-15 | End: 2023-08-30

## 2023-08-14 RX ORDER — MELATONIN
1000 DAILY
Start: 2023-08-15

## 2023-08-14 RX ORDER — METFORMIN HYDROCHLORIDE 500 MG/1
500 TABLET, EXTENDED RELEASE ORAL
Start: 2023-08-14

## 2023-08-14 RX ORDER — HYDROCHLOROTHIAZIDE 25 MG/1
25 TABLET ORAL DAILY
Qty: 30 TABLET | Refills: 0
Start: 2023-08-15

## 2023-08-14 RX ORDER — LISINOPRIL 20 MG/1
20 TABLET ORAL
Qty: 30 TABLET | Refills: 0
Start: 2023-08-15

## 2023-08-14 RX ADMIN — INSULIN HUMAN 2 UNITS: 100 INJECTION, SOLUTION PARENTERAL at 09:16

## 2023-08-14 RX ADMIN — ENOXAPARIN SODIUM 40 MG: 100 INJECTION SUBCUTANEOUS at 09:17

## 2023-08-14 RX ADMIN — INSULIN HUMAN 4 UNITS: 100 INJECTION, SOLUTION PARENTERAL at 12:34

## 2023-08-14 RX ADMIN — ASPIRIN 81 MG: 81 TABLET, CHEWABLE ORAL at 09:16

## 2023-08-14 RX ADMIN — HYDROCHLOROTHIAZIDE 25 MG: 25 TABLET ORAL at 09:16

## 2023-08-14 RX ADMIN — LISINOPRIL 20 MG: 20 TABLET ORAL at 09:16

## 2023-08-14 RX ADMIN — Medication 10 ML: at 09:17

## 2023-08-14 RX ADMIN — CLOPIDOGREL BISULFATE 75 MG: 75 TABLET ORAL at 09:16

## 2023-08-14 RX ADMIN — Medication 1000 UNITS: at 09:16

## 2023-08-14 NOTE — CONSULTS
"Nutrition Services    Patient Name: Italo Pina  YOB: 1960  MRN: [id number]  Admission date: 8/8/2023      CLINICAL NUTRITION ASSESSMENT      Reason for Assessment  Need for education     H&P:    Past Medical History:   Diagnosis Date    Hypertension         Current Problems:   Active Hospital Problems    Diagnosis     **CVA (cerebral vascular accident)         Nutrition/Diet History         Narrative     Patient admitted s/p CVA. Followed by RD for nutrition consult.     Patient has elevated A1C of 13.6%, elevated triglycerides and cholesterol levels, and history of hypertension. Previously has been provided nutrition therapy materials related to uncontrolled DM and triglycerides/cholesterol.     Nutrition follow up.  Patient continues on a mechanical ground diet with nectar thickened liquids.  Variable meal intake noted.  Overall about 75% of meals.       Anthropometrics        Current Height, Weight Height: 167.6 cm (66\")  Weight: 74.1 kg (163 lb 5.8 oz)   Current BMI Body mass index is 26.37 kg/mý.       Weight Hx  Wt Readings from Last 30 Encounters:   08/08/23 1430 74.1 kg (163 lb 5.8 oz)   08/08/23 1416 73.6 kg (162 lb 4.1 oz)            Wt Change Observation No weight history available. BMI normal for age.     Estimated/Assessed Needs       Energy Requirements 20-25 kcal/kg   EST Needs (kcal/day) 2613-2127       Protein Requirements 1.0 g/kg   EST Daily Needs (g/day) 75       Fluid Requirements 25 ml/kg    Estimated Needs (mL/day) 1855     Labs/Medications         Pertinent Labs Reviewed.   Results from last 7 days   Lab Units 08/14/23  0409 08/13/23  0430 08/12/23  0429 08/10/23  0411 08/09/23  0633 08/08/23  1233   SODIUM mmol/L 136 137 137   < > 134* 134*   POTASSIUM mmol/L 4.1 3.5 3.6   < > 3.0* 4.2   CHLORIDE mmol/L 101 101 100   < > 98 95*   CO2 mmol/L 24.4 24.5 25.1   < > 22.6 25.1   BUN mg/dL 36* 30* 24*   < > 15 15   CREATININE mg/dL 1.38* 1.28* 1.17   < > 0.95 1.12   CALCIUM " mg/dL 9.8 10.1 10.0   < > 9.6 10.9*   BILIRUBIN mg/dL  --   --   --   --  0.7 0.5   ALK PHOS U/L  --   --   --   --  77 96   ALT (SGPT) U/L  --   --   --   --  23 25   AST (SGOT) U/L  --   --   --   --  22 23   GLUCOSE mg/dL 186* 130* 120*   < > 257* 425*    < > = values in this interval not displayed.       Results from last 7 days   Lab Units 08/09/23  0633 08/09/23  0427 08/08/23  1233   MAGNESIUM mg/dL 2.0  --   --    PHOSPHORUS mg/dL  --   --  3.6   HEMOGLOBIN g/dL  --   --  14.7   HEMATOCRIT %  --   --  40.7   TRIGLYCERIDES mg/dL  --  251*  --        No results found for: COVID19  Lab Results   Component Value Date    HGBA1C 13.60 (H) 08/08/2023         Pertinent Medications Reviewed.     Current Nutrition Orders & Evaluation of Intake       Oral Nutrition     Current PO Diet Diet: Diabetic Diets; Consistent Carbohydrate; Texture: Mechanical Ground (NDD 2); Fluid Consistency: Nectar Thick   Supplement No active supplement orders       Malnutrition Severity Assessment                Nutrition Diagnosis         Nutrition Dx Problem 1 Altered nutrition related lab values related to lack of prior nutrition related education as evidenced by patient report., family report., and elevated Hgb A1C, elevated triglycerides and cholesterol.       Nutrition Intervention         MNT for high triglycerides/cholesterol and diabetes      Medical Nutrition Therapy/Nutrition Education          Learner     Readiness Patient and Family  Acceptance     Method     Response Explanation and Written Material  Verbalizes understanding     Monitor/Evaluation        Monitor Per protocol, PO intake, Pertinent labs, Weight, POC/GOC, Swallow function, Diet advancement       Nutrition Discharge Plan         To be determined       Electronically signed by:  Jaclyn Coello RD  08/14/23 10:21 EDT

## 2023-08-14 NOTE — THERAPY TREATMENT NOTE
Patient Name: Italo Pina  : 1960    MRN: [id number]                              Today's Date: 2023       Admit Date: 2023    Visit Dx:     ICD-10-CM ICD-9-CM   1. Cerebrovascular accident (CVA), unspecified mechanism  I63.9 434.91   2. Hyperglycemia  R73.9 790.29   3. Hypertension, unspecified type  I10 401.9   4. Difficulty in walking  R26.2 719.7   5. Dysphagia, oropharyngeal  R13.12 787.22   6. Decreased activities of daily living (ADL)  Z78.9 V49.89     Patient Active Problem List   Diagnosis    CVA (cerebral vascular accident)     Past Medical History:   Diagnosis Date    Hypertension      Past Surgical History:   Procedure Laterality Date    KNEE CARTILAGE SURGERY Right       General Information       Row Name 23 1311          OT Time and Intention    Document Type therapy note (daily note)  -ES     Mode of Treatment individual therapy;occupational therapy  -ES       Row Name 23 1311          General Information    Existing Precautions/Restrictions fall  -ES       Row Name 23 1311          Cognition    Orientation Status (Cognition) oriented x 3  Patient cooperative, agreeable to therapy participation. Patient requires increased encouragement for continuation in therapy session today, increased fatigue noted.  -ES       Row Name 23 1311          Safety Issues, Functional Mobility    Impairments Affecting Function (Mobility) balance;strength;postural/trunk control;grasp;motor control;coordination  -ES               User Key  (r) = Recorded By, (t) = Taken By, (c) = Cosigned By      Initials Name Provider Type    Amanda Yarbrough, OTR/L, CSRS Occupational Therapist                     Mobility/ADL's       Row Name 23 1312          Bed Mobility    Bed Mobility supine-sit;sit-supine  -ES     Supine-Sit [place] (Bed Mobility) contact guard;1 person assist  -ES     Sit-Supine [place] (Bed Mobility) contact guard;1 person assist  -ES     Comment, (Bed  Mobility) patient requires increased time with bed mobility this session, patient is able to complete without physical assist  -ES       Row Name 08/14/23 1312          Sit-Stand Transfer    Sit-Stand [place] (Transfers) minimum assist (75% patient effort);1 person assist  -ES     Assistive Device (Sit-Stand Transfers) walker, front-wheeled  -ES               User Key  (r) = Recorded By, (t) = Taken By, (c) = Cosigned By      Initials Name Provider Type    ES Amanda Beaulieu, OTR/L, CSRS Occupational Therapist                   Obj/Interventions       Row Name 08/14/23 1312          Upper Extremity (Manual Muscle Testing)    Comment, MMT: Upper Extremity Patient demonstrates 1/5 strength digits 1-3 this session with trace movement noted, new onset from previous therapy session  -ES       Row Name 08/14/23 1312          Shoulder (Therapeutic Exercise)    Shoulder AROM (Therapeutic Exercise) left;sitting  Patient completes scapular elevation and depression x 10, left internal/external rotation x 10, shoulder flexion to ~45ø x 10. Cueing required for completion of exercise and cueing to achieve max potential ROM  -ES     Shoulder AAROM (Therapeutic Exercise) table slides, flexion;table slides, aBduction;sitting  2 sets x 10 with assist provided for shoulder extension, is able to complete shoulder flexion with added resistance (1lb) for increased UE strength and ROM  -ES       Row Name 08/14/23 1312          Elbow/Forearm (Therapeutic Exercise)    Elbow/Forearm AROM (Therapeutic Exercise) left;flexion;extension;sitting;2 sets;15 repititions  -ES     Elbow/Forearm Strengthening (Therapeutic Exercise) left;flexion;extension;sitting;2 sets;15 repititions  with 1 lb wrist weight sitting EOB  -ES       Row Name 08/14/23 1312          Motor Skills    Motor Skills motor control/coordination interventions  -ES     Functional Endurance fair. Patient tolerates therapy session well, requires intermittent encouragement for  continuation secondary to fatigue  -ES     Motor Control/Coordination Interventions neuro-muscular re-education;weight-bearing activities;functional task specific training;motor pattern re-training  -ES       Row Name 08/14/23 1312          Balance    Balance Assessment sitting dynamic balance  -ES     Dynamic Sitting Balance standby assist  -ES     Position, Sitting Balance unsupported;sitting edge of bed  -ES     Comment, Balance Patient continues to require verbal and tactile cueing for erect sitting posture EOB. Cueing continued to be provided for neutral neck positioning, patient tends to look down seated edge of bed.  -ES               User Key  (r) = Recorded By, (t) = Taken By, (c) = Cosigned By      Initials Name Provider Type    Amanda Yarbrough, OTR/L, CSRS Occupational Therapist                   Goals/Plan    No documentation.                  Clinical Impression       Row Name 08/14/23 1321          Plan of Care Review    Plan of Care Reviewed With patient;daughter  -ES     Progress improving  -ES     Outcome Evaluation Patient with good participation in therapy session. Session focused on continued left upper extremity strengthening to increase range of motion. Patient demonstrates new onset of trace movement in digits 1-3 during therapy session. Continues to tolerate 1lb wrist weight with error augmentation training to increase LUE strength and ROM, tolerates increased reps this session. Patient would benfit from continued skilled OT intervention to promote return to independent baseline.  -ES               User Key  (r) = Recorded By, (t) = Taken By, (c) = Cosigned By      Initials Name Provider Type    Amanda Yarbrough, OTR/L, CSRS Occupational Therapist                   Outcome Measures       Row Name 08/14/23 1325          How much help from another is currently needed...    Putting on and taking off regular lower body clothing? 2  -ES     Bathing (including washing, rinsing, and drying) 2  -ES      Toileting (which includes using toilet bed pan or urinal) 2  -ES     Putting on and taking off regular upper body clothing 3  -ES     Taking care of personal grooming (such as brushing teeth) 3  -ES     Eating meals 3  -ES     AM-PAC 6 Clicks Score (OT) 15  -ES       Row Name 08/14/23 1300 08/14/23 0731       How much help from another person do you currently need...    Turning from your back to your side while in flat bed without using bedrails? 3  -RH 3  -MD    Moving from lying on back to sitting on the side of a flat bed without bedrails? 3  -RH 3  -MD    Moving to and from a bed to a chair (including a wheelchair)? 2  -RH 2  -MD    Standing up from a chair using your arms (e.g., wheelchair, bedside chair)? 3  -RH 3  -MD    Climbing 3-5 steps with a railing? 1  -RH 1  -MD    To walk in hospital room? 2  -RH 2  -MD    AM-PAC 6 Clicks Score (PT) 14  -RH 14  -MD    Highest level of mobility 4 --> Transferred to chair/commode  -RH 4 --> Transferred to chair/commode  -MD      Row Name 08/14/23 1323          Functional Assessment    Outcome Measure Options AM-PAC 6 Clicks Daily Activity (OT);Optimal Instrument  -ES       Row Name 08/14/23 1323          Optimal Instrument    Optimal Instrument Optimal - 3  -ES     Bending/Stooping 2  -ES     Standing 2  -ES     Reaching 3  -ES               User Key  (r) = Recorded By, (t) = Taken By, (c) = Cosigned By      Initials Name Provider Type     Royal Franklin, CORINNA Physical Therapist Assistant    Elvira Kulkarni, RN Registered Nurse    Amanda Yarbrough, OTR/L, CSRS Occupational Therapist                      OT Recommendation and Plan  Planned Therapy Interventions (OT): activity tolerance training, BADL retraining, functional balance retraining, occupation/activity based interventions, patient/caregiver education/training, strengthening exercise, transfer/mobility retraining, neuromuscular control/coordination retraining, cognitive/visual perception retraining  Plan of  Care Review  Plan of Care Reviewed With: patient, daughter  Progress: improving  Outcome Evaluation: Patient with good participation in therapy session. Session focused on continued left upper extremity strengthening to increase range of motion. Patient demonstrates new onset of trace movement in digits 1-3 during therapy session. Continues to tolerate 1lb wrist weight with error augmentation training to increase LUE strength and ROM, tolerates increased reps this session. Patient would benfit from continued skilled OT intervention to promote return to independent baseline.     Time Calculation:   Evaluation Complexity (OT)  Review Occupational Profile/Medical/Therapy History Complexity: brief/low complexity  Assessment, Occupational Performance/Identification of Deficit Complexity: 3-5 performance deficits  Clinical Decision Making Complexity (OT): problem focused assessment/low complexity  Overall Complexity of Evaluation (OT): low complexity     Time Calculation- OT       Row Name 08/14/23 1323 08/14/23 1251          Time Calculation- OT    OT Received On 08/14/23  -ES --     OT Goal Re-Cert Due Date 08/18/23  -ES --        Timed Charges    90770 -  OT Neuromuscular Reeducation Minutes 25  -ES --     88328 - Gait Training Minutes  -- 4  -RH        Total Minutes    Timed Charges Total Minutes 25  -ES 4  -RH      Total Minutes 25  -ES 4  -RH               User Key  (r) = Recorded By, (t) = Taken By, (c) = Cosigned By      Initials Name Provider Type    Royal Solano, PTA Physical Therapist Assistant    Amanda Yarbrough OTR/L, CSRS Occupational Therapist                  Therapy Charges for Today       Code Description Service Date Service Provider Modifiers Qty    28163626124 HC OT NEUROMUSC RE EDUCATION EA 15 MIN 8/14/2023 Amanda Beaulieu OTR/L, BIGG GO 2                 Amanda Beaulieu, OTR/L, JAKES  8/14/2023

## 2023-08-14 NOTE — THERAPY TREATMENT NOTE
Acute Care - Physical Therapy Treatment Note   [facility]     Patient Name: Italo Pina  : 1960  MRN: [id number]  Today's Date: 2023      Visit Dx:     ICD-10-CM ICD-9-CM   1. Cerebrovascular accident (CVA), unspecified mechanism  I63.9 434.91   2. Hyperglycemia  R73.9 790.29   3. Hypertension, unspecified type  I10 401.9   4. Difficulty in walking  R26.2 719.7   5. Dysphagia, oropharyngeal  R13.12 787.22   6. Decreased activities of daily living (ADL)  Z78.9 V49.89     Patient Active Problem List   Diagnosis    CVA (cerebral vascular accident)     Past Medical History:   Diagnosis Date    Hypertension      Past Surgical History:   Procedure Laterality Date    KNEE CARTILAGE SURGERY Right      PT Assessment (last 12 hours)       PT Evaluation and Treatment       Row Name 23 1252          Physical Therapy Time and Intention    Subjective Information no complaints  -RH     Document Type therapy note (daily note)  -     Mode of Treatment physical therapy;individual therapy  -     Patient Effort adequate  -       Row Name 23 1252          Pain Scale: FACES Pre/Post-Treatment    Pain: FACES Scale, Pretreatment 0-->no hurt  -RH     Posttreatment Pain Rating 0-->no hurt  -RH       Row Name 23 1252          Strength (Manual Muscle Testing)    Strength (Manual Muscle Testing) --  Pt LLE strength at grossly 3+/5.  -       Row Name 23 1252          Bed Mobility    Bed Mobility scooting/bridging;supine-sit  -RH     All Activities, [place] (Bed Mobility) minimum assist (75% patient effort)  -     Scooting/Bridging [place] (Bed Mobility) minimum assist (75% patient effort)  -     Supine-Sit [place] (Bed Mobility) minimum assist (75% patient effort)  -     Assistive Device (Bed Mobility) bed rails  -     Comment, (Bed Mobility) Pt maintains sitting at CGA with RUE support.  -       Row Name 23 1252          Transfers    Transfers sit-stand  transfer;stand-sit transfer  -       Row Name 08/14/23 1252          Sit-Stand Transfer    Sit-Stand [place] (Transfers) minimum assist (75% patient effort)  -     Assistive Device (Sit-Stand Transfers) walker, front-wheeled  -RH     Comment, (Sit-Stand Transfer) Pt requires cues for proper placement of his LUE.  -       Row Name 08/14/23 1252          Stand-Sit Transfer    Stand-Sit [place] (Transfers) minimum assist (75% patient effort)  -     Assistive Device (Stand-Sit Transfers) walker, front-wheeled  -RH       Row Name 08/14/23 1252          Gait/Stairs (Locomotion)    Gait/Stairs Locomotion gait/ambulation independence;gait/ambulation assistive device;distance ambulated;gait pattern;gait deviations  -     [place] Level (Gait) minimum assist (75% patient effort);moderate assist (50% patient effort)  -     Assistive Device (Gait) walker, front-wheeled  -RH     Distance in Feet (Gait) 30  -RH     Pattern (Gait) step-to  -     Deviations/Abnormal Patterns (Gait) base of support, narrow;festinating/shuffling;gait speed decreased;stride length decreased  -     Left Sided Gait Deviations heel strike decreased  -     Gait Assessment/Intervention Pt amb with RW and min/mod assist with decreased base of support with decreased gait speed, stride length, LLE heel strike.  -       Row Name 08/14/23 1252          Balance    Dynamic Standing Balance minimal assist;moderate assist  -     Position/Device Used, Standing Balance walker, front-wheeled  -RH       Row Name 08/14/23 1252          Progress Summary (PT)    Progress Toward Functional Goals (PT) progress toward functional goals is gradual  -               User Key  (r) = Recorded By, (t) = Taken By, (c) = Cosigned By      Initials Name Provider Type     Royal Franklin PTA Physical Therapist Assistant                  Left Lower Extremity   Exercise  Reps  Sets    Short arc quads   10 2   Heel slides  10 2   Ankle pumps  10 2    Quad sets  10 2   Straight leg raise  10 2   Hip ab/adduction 10 2        Physical Therapy Education       Title: PT OT SLP Therapies (Done)       Topic: Physical Therapy (Done)       Point: Mobility training (Done)       Learning Progress Summary             Patient Acceptance, E,TB, VU by MD at 8/14/2023 1250    Acceptance, E,TB, VU by RADHA at 8/9/2023 0949                         Point: Precautions (Done)       Learning Progress Summary             Patient Acceptance, E,TB, VU by MD at 8/14/2023 1250    Acceptance, E,TB, VU by RADHA at 8/9/2023 0949                                         User Key       Initials Effective Dates Name Provider Type Discipline    RADHA 06/03/21 -  Steve Woodard PT Physical Therapist PT    MD 01/17/22 -  Elvira Woods RN Registered Nurse Nurse                  PT Recommendation and Plan     Progress Summary (PT)  Progress Toward Functional Goals (PT): progress toward functional goals is gradual   Outcome Measures       Row Name 08/14/23 1300 08/13/23 1300 08/12/23 0817       How much help from another person do you currently need...    Turning from your back to your side while in flat bed without using bedrails? 3  -RH 3  -CS 3  -TS    Moving from lying on back to sitting on the side of a flat bed without bedrails? 3  -RH 3  -CS 3  -TS    Moving to and from a bed to a chair (including a wheelchair)? 2  -RH 2  -CS 2  -TS    Standing up from a chair using your arms (e.g., wheelchair, bedside chair)? 3  -RH 3  -CS 2  -TS    Climbing 3-5 steps with a railing? 1  -RH 1  -CS 2  -TS    To walk in hospital room? 2  - 2  -CS 2  -TS    AM-PAC 6 Clicks Score (PT) 14  -RH 14  -CS 14  -TS       Functional Assessment    Outcome Measure Options -- AM-PAC 6 Clicks Basic Mobility (PT)  -CS --              User Key  (r) = Recorded By, (t) = Taken By, (c) = Cosigned By      Initials Name Provider Type    TS Satnam Gomez, CORINNA Physical Therapist Assistant    Royal Solano PTA Physical Therapist  Assistant    James Watson PTA Physical Therapist Assistant                     Time Calculation:    PT Charges       Row Name 08/14/23 1251             Time Calculation    PT Received On 08/14/23  -RH         Timed Charges    74444 - PT Therapeutic Exercise Minutes 17  -RH      57311 - Gait Training Minutes  4  -RH      27034 - PT Therapeutic Activity Minutes 4  -RH         Total Minutes    Timed Charges Total Minutes 25  -RH       Total Minutes 25  -RH                User Key  (r) = Recorded By, (t) = Taken By, (c) = Cosigned By      Initials Name Provider Type     Royal Franklin PTA Physical Therapist Assistant                  Therapy Charges for Today       Code Description Service Date Service Provider Modifiers Qty    67471105310 HC PT THER PROC EA 15 MIN 8/14/2023 Royal Franklin PTA GP 1    37776961776 HC GAIT TRAINING EA 15 MIN 8/14/2023 Royal Franklin PTA GP 1            PT G-Codes  Outcome Measure Options: AM-PAC 6 Clicks Basic Mobility (PT)  AM-PAC 6 Clicks Score (PT): 14  AM-PAC 6 Clicks Score (OT): 13    Royal Franklin PTA  8/14/2023

## 2023-08-14 NOTE — PROGRESS NOTES
[facility]   Hospitalist Progress Note  Date: 2023  Patient Name: Italo Pina  : 1960  MRN: [id number]  Date of admission: 2023      Subjective   Subjective     Chief Complaint: Follow up for slurred speech and trouble swallowing    Summary: 63-year-old male, no reported past medical history who presented with acute episode of trouble swallowing and slurred speech.  Did not meet criteria for thrombolytics.  Blood pressure very high on presentation.  Teleneurology consulted. CT head without contrast did not report acute infarct or hemorrhage.  CT cerebral perfusion study no core infarction noted.  CT angiogram head and neck no large vessel occlusion or hemodynamically significant stenoses but there was diffuse atherosclerotic changes and narrowing noted within the cavernous segments of the internal carotid arteries bilaterally.  Labs notable for glucose 425 and calcium 10.9.  MRI brain with acute/subacute infarct in the right kenia.  On high intensity statin and aspirin and Plavix for 3 weeks then monotherapy with aspirin.  Permissive hypertension allowed then initiated on antihypertensive agents, HCTZ and lisinopril, blood pressure at goal. Hemoglobin A1c >3, initiated on insulin, blood sugars at goal, will need to go on insulin and metformin. Stable for discharge, has bed at [facility] on 8/15.    Interval Followup:   No major changes.  Blood pressure at goal.  Tolerating modified diet.  Blood sugars controlled.  No major change in left upper extremity weakness.  Working with PT.  No new complaints.    Review of Systems  Gastrointestinal:  No Nausea, No Vomiting, no abdominal pain  Neuro: +Weakness LUE otherwise denied complaints    Objective   Objective     Vitals:   Temp:  [97.9 øF (36.6 øC)-98.2 øF (36.8 øC)] 98.2 øF (36.8 øC)  Heart Rate:  [85-93] 85  Resp:  [16-18] 18  BP: (121-138)/(74-85) 133/85  Physical Exam    Constitutional: Awake, conversant, NAD   Eyes: Pupils equal and  reactive, no conjunctival injections   HENT: NCAT, nares patent, MMM   Respiratory: Clear to auscultation bilaterally, nonlabored respirations    Cardiovascular: RRR, no murmurs, no edema   Gastrointestinal: Positive bowel sounds, soft, nontender, nondistended   Neurologic: Alert, Cranial Nerves grossly intact, left upper extremity weakness   Skin: Extremities warm, no rashes or wounds    Result Review    Result Review:  I have personally reviewed the following over the last 24 hours (07:00 to 07:00) and agree with the following findings  [x]  Laboratory  CBC          8/8/2023    12:33   CBC   WBC 6.34    RBC 4.77    Hemoglobin 14.7    Hematocrit 40.7    MCV 85.3    MCH 30.8    MCHC 36.1    RDW 12.7    Platelets 192      Lipid Panel          8/9/2023    04:27   Lipid Panel   Total Cholesterol 246    Triglycerides 251    HDL Cholesterol 46    VLDL Cholesterol 46    LDL Cholesterol  154    LDL/HDL Ratio 3.26      Hemoglobin A1c 13.60    Vitamin D [place] 20.6    []  Microbiology  []  Radiology  [x]  EKG/Telemetry monitor personally reviewed: NSR, no events  []  Cardiology/Vascular   []  Pathology  []  Old records  [x]  Other:    Intake/Output Summary (Last 24 hours) at 8/14/2023 1020  Last data filed at 8/14/2023 0855  Gross per 24 hour   Intake 480 ml   Output 1350 ml   Net -870 ml         Assessment & Plan   Assessment / Plan     Assessment/Plan:  HTN - controlled  Nausea, resolved  Acute/subacute CVA involving right kenia  Chronic small vessel ischemic changes involving basal ganglia   Uncontrolled diabetes, likely type II, new diagnosis  Hyperlipidemia  Vitamin D insufficiency  Hypokalemia, resolved      Blood pressure at goal.  Creatinine 1.38, rise not unexpected given addition of new medications.  Remains nonoliguric.  Continue Lisinopril 20 mg daily and HCTZ 25 mg daily.   Blood glucose at goal.  Continue Levemir 15 units daily.  Continue moderate dose SSI per protocol. Goal glucose 140-180.  Add metformin on  discharge.  Education on diabetic diet and insulin teaching reinforced.  Family will assist with this further at home.  Continue aspirin 81 mg daily and Plavix 75 mg daily for 3 weeks then continue just on monotherapy with aspirin.  Will need follow-up with neurology  Continue high intensity atorvastatin  Continue daily vitamin D supplementation  Consider outpatient referral to sleep medicine for CHARLEE evaluation  Continued modified diet, aspiration precautions   Continue PT/OT.  Up with assistance.  Fall precautions  DVT prophylaxis: Lovenox 40 mg daily    Disposition: Stable for discharge, has bed at [facility] 8/15    Discussed plan with RN.    DVT prophylaxis:  Medical and mechanical DVT prophylaxis orders are present.    CODE STATUS:   Code Status (Patient has no pulse and is not breathing): CPR (Attempt to Resuscitate)  Medical Interventions (Patient has pulse or is breathing): Full Support    Electronically signed by Gaudencio Franz DO, 08/14/23, 10:24 AM EDT.

## 2023-08-14 NOTE — PLAN OF CARE
Goal Outcome Evaluation:  Plan of Care Reviewed With: patient, spouse        Progress: improving

## 2023-08-14 NOTE — DISCHARGE SUMMARY
[facility]         HOSPITALIST  DISCHARGE SUMMARY    Patient Name: Italo Pina  : 1960  MRN: [id number]    Date of Admission: 2023  Date of Discharge:  23  Primary Care Physician: Provider, No Known    Consults       Date and Time Order Name Status Description    2023  9:32 AM Inpatient Neurology Consult Stroke      2023  3:01 PM Hospitalist (on-call MD unless specified)              Active and Resolved Hospital Problems:    Acute/subacute CVA involving right kenia  Chronic small vessel ischemic changes involving basal ganglia   Uncontrolled diabetes, likely type II, new diagnosis  Hypertension, new diagnosis  Hyperlipidemia  Vitamin D insufficiency  Hypokalemia    Hospital Course     Hospital Course:  Italo Pina is a 63 y.o. male with reported past medical history who presented with acute episode of trouble swallowing and slurred speech.  Did not meet criteria for thrombolytics.  Blood pressure very high on presentation otherwise vital stable. CT head without contrast not acute infarct or hemorrhage.  MRI brain with acute/subacute infarct in the right kenia. CT cerebral perfusion study no core infarction noted.  CT angiogram head and neck no large vessel occlusion or hemodynamically significant stenoses but there was diffuse atherosclerotic changes and narrowing noted within the cavernous segments of the internal carotid arteries bilaterally.  TTE preserved ejection fraction with no valvular disease.  No arrhythmias on telemetry.  Labs notable for glucose 425 and calcium 10.9. Hemoglobin A1c >13, initiated on insulin with good effect, EGFR greater than 30, once daily metformin added on discharge.  Education on blood sugar monitoring and insulin provided, family will assist with this further at home.  Found to have vitamin D insufficiency and started on supplementation. Initiated on dual antiplatelet therapy aspirin/Plavix for 3 weeks followed by monotherapy  with aspirin indefinitely.  On high intensity atorvastatin. Permissive hypertension initially allowed then started on antihypertensive agents, hydrochlorothiazide and lisinopril, blood pressure now at goal.  Progressed with speech therapy and PT/OT.  Tolerating modified diet.  Once bed available he was discharged to [facility] in stable condition.  Follow-up with neurology planned.    DISCHARGE Follow Up Recommendations for labs and diagnostics:   Repeat hemoglobin A1c in 3-month    Day of Discharge     Vital Signs:  Temp:  [97.9 øF (36.6 øC)-98.2 øF (36.8 øC)] 98.1 øF (36.7 øC)  Heart Rate:  [74-93] 74  Resp:  [16-18] 18  BP: (121-138)/(74-90) 132/90  Physical Exam:   GENERAL: The patient is conversant and nontoxic.  HEENT: PERRLA, Oropharynx clear. Moist mucous membranes.   HEART: Regular rate and rhythm. No edema  LUNGS: Equal air entry, clear to auscultation bilaterally.  ABDOMEN: Soft, positive bowel sounds, nontender, nondistended  SKIN: No rash or open wounds   NEUROLOGIC: Alert, cranial nerves grossly intact, left upper extremity weakness    Discharge Details        Discharge Medications        New Medications        Instructions Start Date   aspirin 81 MG chewable tablet   81 mg, Oral, Daily   Start Date: August 15, 2023     atorvastatin 80 MG tablet  Commonly known as: LIPITOR   80 mg, Oral, Nightly      cholecalciferol 25 MCG (1000 UT) tablet  Commonly known as: VITAMIN D3   1,000 Units, Oral, Daily   Start Date: August 15, 2023     clopidogrel 75 MG tablet  Commonly known as: PLAVIX   75 mg, Oral, Daily   Start Date: August 15, 2023     hydroCHLOROthiazide 25 MG tablet  Commonly known as: HYDRODIURIL   25 mg, Oral, Daily   Start Date: August 15, 2023     insulin detemir 100 UNIT/ML injection  Commonly known as: LEVEMIR   15 Units, Subcutaneous, Nightly      lisinopril 20 MG tablet  Commonly known as: PRINIVIL,ZESTRIL   20 mg, Oral, Every 24 Hours Scheduled   Start Date: August 15, 2023     metFORMIN ER  500 MG 24 hr tablet  Commonly known as: GLUCOPHAGE-XR   500 mg, Oral, Daily With Breakfast               No Known Allergies    Discharge Disposition:  Rehab Facility or Unit (DC - External)    Diet:  Hospital:  Diet Order   Procedures    Diet: Diabetic Diets; Consistent Carbohydrate; Texture: Mechanical Ground (NDD 2); Fluid Consistency: Nectar Thick       Discharge Activity:   Activity Instructions       Gradually Increase Activity Until at Pre-Hospitalization Level      Up WIth Assist              CODE STATUS:  Code Status and Medical Interventions:   Ordered at: 08/08/23 1610     Code Status (Patient has no pulse and is not breathing):    CPR (Attempt to Resuscitate)     Medical Interventions (Patient has pulse or is breathing):    Full Support         No future appointments.    Additional Instructions for the Follow-ups that You Need to Schedule       Discharge Follow-up with PCP   As directed       Currently Documented PCP:    Provider, No Known    PCP Phone Number:    None     Follow Up Details: establish care        Discharge Follow-up with Specialty: Neurology; 1 Month   As directed      Specialty: Neurology   Follow Up: 1 Month                Pertinent  and/or Most Recent Results     PROCEDURES:  NONE    LAB RESULTS:      Lab 08/08/23  1233   WBC 6.34   HEMOGLOBIN 14.7   HEMATOCRIT 40.7   PLATELETS 192   NEUTROS ABS 3.93   IMMATURE GRANS (ABS) 0.01   LYMPHS ABS 1.86   MONOS ABS 0.47   EOS ABS 0.04   MCV 85.3   PROTIME 13.0   APTT 24.5         Lab 08/14/23  0409 08/13/23  0430 08/12/23  0429 08/11/23  0416 08/10/23  0411 08/09/23  0633 08/08/23  1233   SODIUM 136 137 137 138 137 134* 134*   POTASSIUM 4.1 3.5 3.6 3.3* 3.5 3.0* 4.2   CHLORIDE 101 101 100 102 100 98 95*   CO2 24.4 24.5 25.1 25.3 24.0 22.6 25.1   ANION GAP 10.6 11.5 11.9 10.7 13.0 13.4 13.9   BUN 36* 30* 24* 22 19 15 15   CREATININE 1.38* 1.28* 1.17 1.08 1.12 0.95 1.12   EGFR 57.5* 62.9 70.0 77.1 73.8 89.9 73.8   GLUCOSE 186* 130* 120* 132* 204*  257* 425*   CALCIUM 9.8 10.1 10.0 9.7 9.8 9.6 10.9*   MAGNESIUM  --   --   --   --   --  2.0  --    PHOSPHORUS  --   --   --   --   --   --  3.6   HEMOGLOBIN A1C  --   --   --   --   --   --  13.60*   TSH  --   --   --   --   --  1.630  --          Lab 08/09/23  0633 08/08/23  1233   TOTAL PROTEIN 7.0 8.0   ALBUMIN 4.0 4.6   GLOBULIN 3.0 3.4   ALT (SGPT) 23 25   AST (SGOT) 22 23   BILIRUBIN 0.7 0.5   ALK PHOS 77 96         Lab 08/08/23  1233   HSTROP T 16*   PROTIME 13.0   INR 0.97         Lab 08/09/23  0427   CHOLESTEROL 246*   LDL CHOL 154*   HDL CHOL 46   TRIGLYCERIDES 251*             Brief Urine Lab Results  (Last result in the past 365 days)        Color   Clarity   Blood   Leuk Est   Nitrite   Protein   CREAT   Urine HCG        08/08/23 1554 Yellow   Clear   Negative   Negative   Negative   Negative                 Microbiology Results (last 10 days)       ** No results found for the last 240 hours. **            CT Head Without Contrast    Result Date: 8/9/2023  Impression:   No acute brain abnormality is seen.  No acute intracranial hemorrhage.  No acute skull fracture.    Please note that portions of this note were completed with a voice recognition program.  SHAHRAM RUVALCABA JR, MD       Electronically Signed and Approved By: SHAHRAM RUVALCABA JR, MD on 8/09/2023 at 0:22              CT Angiogram Neck    Result Date: 8/8/2023  Impression:   1. No evidence of large vessel occlusion or aneurysm formation noted.  Atherosclerotic changes noted as above most notable within the left-sided vertebral artery at the base of the brain.  Diffuse atherosclerotic changes and narrowing noted within the cavernous segments of the internal carotid arteries bilaterally.  Atherosclerotic changes without flow limiting stenosis noted within the carotid arteries as above 2. Incidental note of heterogeneous appearance of the right palatine tonsil which could represent underlying tonsillitis.  Recommend clinical correlation and  follow-up as clinically indicated.  Consider direct visualization.     DEVEN BURROUGHS MD       Electronically Signed and Approved By: DEVEN BURROUGHS MD on 8/08/2023 at 13:59             MRI Brain Without Contrast    Result Date: 8/8/2023  Impression:   1. Small focus of restricted diffusion in the right kenia suspected to represent an acute/subacute infarct. 2. Volume loss and suspected chronic small vessel ischemic changes with probable remote lacunar infarcts in the right periventricular white matter/basal basal ganglia. 3. Foci of suspected remote microhemorrhages which could be associated with hypertension.  Ambulate angiopathy is considered less likely given the number and distribution.      BERNA ASHLEY MD       Electronically Signed and Approved By: BERNA ASHLEY MD on 8/08/2023 at 17:43             FL Video Swallow With Speech Single Contrast    Result Date: 8/9/2023  Impression:    1. Silent tracheal aspiration with nectar thick liquid barium via straw trial 2. Deep laryngeal penetration with thin liquid barium via spoon trial  Please consult speech pathology report for further details regarding exam and dietary recommendations    STUART ROBERTO       Electronically Signed and Approved By: LOPEZ HORTA MD on 8/09/2023 at 13:17             XR Chest 1 View    Result Date: 8/8/2023  Impression:   No radiographic findings of acute cardiopulmonary abnormality.       BERNA ASHLEY MD       Electronically Signed and Approved By: BERNA ASHLEY MD on 8/08/2023 at 13:27             CT Head Without Contrast Stroke Protocol    Result Date: 8/8/2023  Impression:   1. No acute hemorrhage or mass effect 2. Extensive white matter changes within the supratentorial brain compatible small-vessel ischemic disease.  Age-indeterminate small focus of low attenuation on the right side of the kenia.  MRI would be more sensitive for evaluation of acute ischemic change   Findings were communicated to the emergency department  attending covering the patient at 12:09 p.m.     DEVEN BURROUGHS MD       Electronically Signed and Approved By: DEVEN BURROUGHS MD on 8/08/2023 at 12:12             CT Angiogram Head w AI Analysis of LVO    Result Date: 8/8/2023  Impression:   1. No evidence of large vessel occlusion or aneurysm formation noted.  Atherosclerotic changes noted as above most notable within the left-sided vertebral artery at the base of the brain.  Diffuse atherosclerotic changes and narrowing noted within the cavernous segments of the internal carotid arteries bilaterally.  Atherosclerotic changes without flow limiting stenosis noted within the carotid arteries as above 2. Incidental note of heterogeneous appearance of the right palatine tonsil which could represent underlying tonsillitis.  Recommend clinical correlation and follow-up as clinically indicated.  Consider direct visualization.     DEVEN BRUROUGHS MD       Electronically Signed and Approved By: DEVEN BURROUGHS MD on 8/08/2023 at 13:59             CT CEREBRAL PERFUSION WITH & WITHOUT CONTRAST    Result Date: 8/8/2023  Impression:   1. Examination somewhat degraded by patient motion artifact 2. 5 mL focus of prolonged T-max greater than 6 seconds in the posterosuperior cerebellum suspected to be artifactual related to the motion.  Ischemic brain at risk would also be in the differential.      Bill Barker M.D.       Electronically Signed and Approved By: Bill Barker M.D. on 8/08/2023 at 12:55              Results for orders placed during the hospital encounter of 08/08/23    Duplex Carotid Ultrasound CAR    Interpretation Summary    Proximal right internal carotid artery plaque without significant stenosis.    Proximal left internal carotid artery plaque without significant stenosis.    Vertebral flow is antegrade bilaterally.      Results for orders placed during the hospital encounter of 08/08/23    Duplex Carotid Ultrasound CAR    Interpretation Summary     Proximal right internal carotid artery plaque without significant stenosis.    Proximal left internal carotid artery plaque without significant stenosis.    Vertebral flow is antegrade bilaterally.      Results for orders placed during the hospital encounter of 08/08/23    Adult Transthoracic Echo Complete W/ Cont if Necessary Per Protocol    Interpretation Summary  Normal left ventricular systolic function with mild left ventricular hypertrophy.  No significant valve abnormalities noted.      Labs Pending at Discharge:      Time spent on Discharge including face to face service:  >30 minutes    Electronically signed by Gaudencio Franz DO, 08/14/23, 1:11 PM EDT.

## 2023-08-14 NOTE — CASE MANAGEMENT/SOCIAL WORK
Patient will discharge to [facility] today.    [facility]   [address] [place] [address] [place], [address]  [phone]

## 2023-08-14 NOTE — THERAPY TREATMENT NOTE
Acute Care - Speech Language Pathology   Swallow Treatment Note JUDY Herron     Patient Name: Italo Pina  : 1960  MRN: [id number]  Today's Date: 2023               Admit Date: 2023    Visit Dx:     ICD-10-CM ICD-9-CM   1. Cerebrovascular accident (CVA), unspecified mechanism  I63.9 434.91   2. Hyperglycemia  R73.9 790.29   3. Hypertension, unspecified type  I10 401.9   4. Difficulty in walking  R26.2 719.7   5. Dysphagia, oropharyngeal  R13.12 787.22   6. Decreased activities of daily living (ADL)  Z78.9 V49.89     Patient Active Problem List   Diagnosis    CVA (cerebral vascular accident)     Past Medical History:   Diagnosis Date    Hypertension      Past Surgical History:   Procedure Laterality Date    KNEE CARTILAGE SURGERY Right      SPEECH PATHOLOGY DYSPHAGIA TREATMENT    Subjective/Behavioral Observations: Patient seen at bedside.  Daughter present      Current Diet:-Mechanical soft diet-spoonfed nectar thick liquids        Current Strategies: Double swallow, alternate solids and liquids, slow rate      Treatment received: Patient seen at bedside.  Daughter present.  Daughter reports improvement with patient self-feeding.  Patient tolerating pur‚e and nectar thick liquids without clinical signs or symptoms of aspiration.  Education with daughter regarding importance of spoon feeding to manage bolus volume as patient with noted aspiration with nectar thick liquids on modified barium swallow with larger bolus volumes.  Therapeutic trials of thin liquids were completed.  Patient with clinical signs of aspiration, cough and wet voicing noted.      Results of treatment: As stated    Progress toward goals: Progressing        Barriers to Achieving goals: Medical status        Plan of care:/changes in plan: Continue mechanical soft diet-spoonfed nectar thick liquids  90 degrees upright for all intake  Slow rate  Double swallow  Alternate solids and liquids  Oral meds in  applesauce        EDUCATION  The patient has been educated in the following areas:   Dysphagia (Swallowing Impairment).          Terra Hilario, SLP  8/14/2023

## 2023-08-23 ENCOUNTER — TRANSCRIBE ORDERS (OUTPATIENT)
Dept: ADMINISTRATIVE | Facility: HOSPITAL | Age: 63
End: 2023-08-23
Payer: MEDICAID

## 2023-08-23 DIAGNOSIS — R13.12 OROPHARYNGEAL DYSPHAGIA: Primary | ICD-10-CM

## 2023-08-24 ENCOUNTER — HOSPITAL ENCOUNTER (OUTPATIENT)
Dept: GENERAL RADIOLOGY | Facility: HOSPITAL | Age: 63
Discharge: HOME | End: 2023-08-24
Admitting: PHYSICAL MEDICINE & REHABILITATION
Payer: MEDICAID

## 2023-08-24 DIAGNOSIS — R13.12 OROPHARYNGEAL DYSPHAGIA: ICD-10-CM

## 2023-08-24 PROCEDURE — 92611 MOTION FLUOROSCOPY/SWALLOW: CPT

## 2023-08-24 PROCEDURE — 74230 X-RAY XM SWLNG FUNCJ C+: CPT

## 2023-08-24 RX ADMIN — BARIUM SULFATE 1 TEASPOON(S): 0.6 CREAM ORAL at 13:52

## 2023-08-24 RX ADMIN — BARIUM SULFATE 55 ML: 0.81 POWDER, FOR SUSPENSION ORAL at 13:52

## 2023-08-24 RX ADMIN — BARIUM SULFATE 50 ML: 400 SUSPENSION ORAL at 13:52

## 2023-08-24 NOTE — MBS/VFSS/FEES
Outpatient  - Speech Language Pathology   Swallow  Modified Barium Swallow Study  JUDY Herron     Patient Name: Italo Pina  : 1960  MRN: [id number]  Today's Date: 2023               Admit Date: 2023    Visit Dx:     ICD-10-CM ICD-9-CM   1. Oropharyngeal dysphagia  R13.12 787.22     Patient Active Problem List   Diagnosis    CVA (cerebral vascular accident)     Past Medical History:   Diagnosis Date    Hypertension      Past Surgical History:   Procedure Laterality Date    KNEE CARTILAGE SURGERY Right        MODIFIED BARIUM SWALLOW STUDY: SPEECH PATHOLOGY REPORT        DATE OF SERVICE: 2023    PERTINENT INFORMATION:  Mr. Pina is a 63year old male with diagnosis of dysphagia, status post stroke.    He was referred for an MBSS by Dr. Bryant to rule out aspiration as well as to determine appropriate treatment plan for this patient.      PROCEDURE:    Mr. Pina was alert and cooperative.  The patient was viewed in the lateral plane.  The following Ba consistencies were administered: Thin liquids, nectar thick liquids, barium mixed with applesauce, barium paste, barium mixed with cracker. The following compensatory swallowing strategies were performed: Bolus modification, cyclic ingestion.    RESULTS:    1.  Nectar liquid by spoon with maximum spillage to the vallecula, swallow completed with transient laryngeal penetration.  2. Nectar liquid by cup with spillage to the vallecula and piriform sinus, swallow completed with transient laryngeal penetration.  Minimal oral residue spilling to the vallecula with patient cued for swallow to clear.  3. Thin liquid by cup with maximum spillage to the pharynx, swallow completed with deep laryngeal penetration to the level of the vocal cords.  Second swallow with aspiration occurring with no cough.   4. Pur‚e with lingual pumping, spillage to the vallecula, swallow completed.  5. Pudding with lingual pumping, spillage to the vallecula, swallow  completed.  Oral residue noted.  6.  Oral residue from previous swallow noted pooling in vallecula and piriform sinus. Solid results with chewing, spillage to the vallecula followed by swallow completed.  7.  Nectar liquid by straw with spillage to the pharynx, swallow completed.  8.  Thin liquid by cup with patient cued for small sip with chin tuck, maximum spillage to the pharynx, swallow completed with deep laryngeal penetration.      IMPRESSIONS:    Mr. Pina demonstrated oropharyngeal dysphagia characterized by swallow delay, decreased laryngeal elevation, decreased lingual strength and coordination.  Laryngeal penetration noted with liquids.  Silent aspiration noted with thin liquid.     FUNCTIONAL DEFICIT: Patient scored level 5 of 7 on Functional Communication Measures for swallowing indicating a 20-39% limitation in function for current status, goal status, and discharge status.      RECOMMENDATIONS:   1.  Diet with regular soft solids cut small, nectar thickened liquid.  2.  Positioning fully upright for all p.o. intake and 30 minutes following.  3.  Alternate small bites and small sips of solids and liquids at a slow rate.  Double swallow each bite/sip.  Single small sips of liquid with chin tuck.  4.  Continue with speech pathology services to address dysphagia.        Yes, patient/responsible party agrees with the plan of care and has been informed of all alternatives, risks and benefits.    Thank you for this referral.                                                                                               EDUCATION  The patient has been educated in the following areas:   Modified Diet Instruction.              Time Calculation:    Time Calculation- SLP       Row Name 08/24/23 1449             Time Calculation- SLP    SLP Received On 08/24/23  -TB         Untimed Charges    SLP Eval/Re-eval  ST Motion Fluoro Eval Swallow - 40613  -TB      62403-VR Motion Fluoro Eval Swallow Minutes 90  -TB          Total Minutes    Untimed Charges Total Minutes 90  -TB       Total Minutes 90  -TB                User Key  (r) = Recorded By, (t) = Taken By, (c) = Cosigned By      Initials Name Provider Type    Ernestina Horvath SLP Speech and Language Pathologist                    Therapy Charges for Today       Code Description Service Date Service Provider Modifiers Qty    [id number] HC ST MOTION FLUORO EVAL SWALLOW 6 8/24/2023 Ernestina Jarrett SLP GN 1                 MINAL Shields  8/24/2023

## 2023-08-31 ENCOUNTER — OFFICE VISIT (OUTPATIENT)
Dept: FAMILY MEDICINE CLINIC | Facility: CLINIC | Age: 63
End: 2023-08-31
Payer: MEDICAID

## 2023-08-31 VITALS
TEMPERATURE: 98.1 F | OXYGEN SATURATION: 100 % | HEART RATE: 63 BPM | BODY MASS INDEX: 25.26 KG/M2 | HEIGHT: 66 IN | WEIGHT: 157.2 LBS | DIASTOLIC BLOOD PRESSURE: 78 MMHG | SYSTOLIC BLOOD PRESSURE: 132 MMHG

## 2023-08-31 DIAGNOSIS — Z12.11 SCREENING FOR COLON CANCER: ICD-10-CM

## 2023-08-31 DIAGNOSIS — E11.649 TYPE 2 DIABETES MELLITUS WITH HYPOGLYCEMIA WITHOUT COMA, WITH LONG-TERM CURRENT USE OF INSULIN: Primary | ICD-10-CM

## 2023-08-31 DIAGNOSIS — N28.9 KIDNEY DISEASE: ICD-10-CM

## 2023-08-31 DIAGNOSIS — E55.9 VITAMIN D DEFICIENCY: ICD-10-CM

## 2023-08-31 DIAGNOSIS — I63.9 CEREBROVASCULAR ACCIDENT (CVA), UNSPECIFIED MECHANISM: ICD-10-CM

## 2023-08-31 DIAGNOSIS — Z11.59 NEED FOR HEPATITIS C SCREENING TEST: ICD-10-CM

## 2023-08-31 DIAGNOSIS — E78.2 MIXED HYPERLIPIDEMIA: ICD-10-CM

## 2023-08-31 DIAGNOSIS — Z79.4 TYPE 2 DIABETES MELLITUS WITH HYPOGLYCEMIA WITHOUT COMA, WITH LONG-TERM CURRENT USE OF INSULIN: Primary | ICD-10-CM

## 2023-08-31 DIAGNOSIS — R53.81 DEBILITY: ICD-10-CM

## 2023-08-31 RX ORDER — AMLODIPINE BESYLATE 5 MG/1
5 TABLET ORAL DAILY
COMMUNITY
End: 2023-08-31 | Stop reason: SDUPTHER

## 2023-08-31 RX ORDER — LISINOPRIL 20 MG/1
20 TABLET ORAL
Qty: 90 TABLET | Refills: 3 | Status: SHIPPED | OUTPATIENT
Start: 2023-08-31

## 2023-08-31 RX ORDER — SENNOSIDES 8.6 MG
650 CAPSULE ORAL EVERY 8 HOURS PRN
COMMUNITY

## 2023-08-31 RX ORDER — INSULIN GLARGINE 100 [IU]/ML
INJECTION, SOLUTION SUBCUTANEOUS
Qty: 75 ML | Refills: 0 | Status: SHIPPED | OUTPATIENT
Start: 2023-08-31

## 2023-08-31 RX ORDER — AMLODIPINE BESYLATE 5 MG/1
5 TABLET ORAL DAILY
Qty: 90 TABLET | Refills: 3 | Status: SHIPPED | OUTPATIENT
Start: 2023-08-31

## 2023-08-31 RX ORDER — ROSUVASTATIN CALCIUM 40 MG/1
40 TABLET, COATED ORAL DAILY
Qty: 90 TABLET | Refills: 1 | Status: SHIPPED | OUTPATIENT
Start: 2023-08-31

## 2023-08-31 RX ORDER — METFORMIN HYDROCHLORIDE 500 MG/1
500 TABLET, EXTENDED RELEASE ORAL
Qty: 90 TABLET | Refills: 1
Start: 2023-08-31

## 2023-08-31 RX ORDER — INSULIN GLARGINE 100 [IU]/ML
INJECTION, SOLUTION SUBCUTANEOUS DAILY
COMMUNITY
End: 2023-08-31 | Stop reason: SDUPTHER

## 2023-08-31 RX ORDER — ASPIRIN 81 MG/1
81 TABLET ORAL DAILY
Qty: 90 TABLET | Refills: 1 | Status: SHIPPED | OUTPATIENT
Start: 2023-08-31

## 2023-08-31 RX ORDER — MELATONIN
1000 DAILY
Qty: 90 TABLET | Refills: 3
Start: 2023-08-31

## 2023-08-31 RX ORDER — HYDROCHLOROTHIAZIDE 25 MG/1
25 TABLET ORAL DAILY
Qty: 90 TABLET | Refills: 3
Start: 2023-08-31

## 2023-08-31 NOTE — PROGRESS NOTES
Chief Complaint  [facility] care  Stroke  Diabetes    Subjective        Italo Pina presents to [facility]  History of Present Illness  Patient presents today accompanied by his wife, laura as well as close friend, Marie Chino).  Patient had not previously had a primary care doctor or had any continuity care regularly until he developed strokelike symptoms leaving him with left-sided weakness.  He did go to the emergency room on 8/8/2023.  Ultimately it was revealed on a brain MRI that he had a small focus of restricted diffusion in the right kenia suspected to represent an acute/subacute infarct.  There is also noted suspected remote microhemorrhages which could be associated with hypertension.  Patient was noted to have significantly elevated blood pressure and had been undiagnosed and untreated for hypertension.  He did have a carotid Doppler done that showed no significant stenosis but there was plaque present.  Angiogram of the neck revealed that there was no evidence of large vessel occlusion or aneurysm but there was atherosclerotic changes noted most notable in the left-sided vertebral artery.  Patient was having some swallowing difficulties with these as well but this has improved now.  He was seen by speech and had a swallow evaluation.  He was recommended to have nectar thickened liquid as well as alternating between small bites and sips of liquids.  Patient was also noted to have uncontrolled diabetes which is a new diagnosis.  A1c was 13.6.  He was placed on insulin.  He is currently taking 32 units in the morning and 8 units at bedtime of the Lantus.  I did discuss taking Lantus once daily and we will start at 40 units and increase by 2 units every 3 days at the glucose level is still greater than 130.  This was explained in detail to the patient as well as spouse and friend.  He is taking metformin and we discussed continuing current management.  Patient was also  "placed on aspirin as well as Plavix.  He will transition over to monotherapy once his Plavix has been completed.  For blood pressure he is taking lisinopril 20 mg daily as well as amlodipine 5 mg daily and hydrochlorothiazide 25 mg daily.  Blood pressure appears to be acceptable today at 132/78.  He is due for colon cancer screening.  Denies any family history of colon cancer and has not previously had a colonoscopy.  Denies any GI complaints.  We did discuss a Cologuard test.  He does have a significant smoking history but quit in 2017 and had smoked 1 pack/day for 40 years.  He would like to think about having a low-dose chest CT and is not ready to commit today as we have talked about several issues and he would like to hold off and reconsider at a follow-up visit.  He is taking atorvastatin 80 mg daily.  He is having body aches with this.  I discussed switching him over to Crestor 40 mg daily.  We may need to consider Repatha.  Plan to monitor and make changes depending on clinical course.  LDL was 154 so he can definitely benefit from statin therapy.  He is also on vitamin D for deficiency.  We discussed having labs repeated prior to next appointment.  I plan to check an A1c as well as fructosamine level.  Objective   Vital Signs:  /78 (BP Location: Right arm, Patient Position: Sitting)   Pulse 63   Temp 98.1 øF (36.7 øC) (Oral)   Ht 167.6 cm (66\")   Wt 71.3 kg (157 lb 3.2 oz)   SpO2 100%   BMI 25.37 kg/mý   Estimated body mass index is 25.37 kg/mý as calculated from the following:    Height as of this encounter: 167.6 cm (66\").    Weight as of this encounter: 71.3 kg (157 lb 3.2 oz).             Physical Exam   General: AAO x3, no acute distress, pleasant  HEENT: Normocephalic, atraumatic, no discharge in the eyes, no discharge from the nose, no oropharyngeal erythema or exudates, and TMs intact bilaterally with no erythema, no cervical tenderness or lymphadenopathy  Cardiovascular: Regular rate " and rhythm without appreciable murmur  Respiratory: Clear to auscultation bilaterally no RRW  Gastrointestinal: Soft nontender nondistended with bowel sounds present  extremities: No edema  Musculoskeletal: Weakness noted in the left upper extremity as well as left lower extremity.  Neurologic: CN II through XII intact.   Psychiatric: Normal mood and affect  Result Review :                   Assessment and Plan   Diagnoses and all orders for this visit:    1. Type 2 diabetes mellitus with hyperglycemia, with long-term current use of insulin (Primary)  -     Fructosamine; Future  -     Ambulatory Referral to Chronic Care Management  -     Ambulatory Referral to Home Health  -     CBC & Differential; Future  -     Comprehensive Metabolic Panel; Future  -     Hemoglobin A1c; Future  -     Microalbumin / Creatinine Urine Ratio - Urine, Clean Catch; Future    2. Debility  -     Ambulatory Referral to Chronic Care Management  -     Ambulatory Referral to Home Health    3. Cerebrovascular accident (CVA), unspecified mechanism  -     Ambulatory Referral to Chronic Care Management  -     Ambulatory Referral to Home Health    4. Kidney disease    5. Vitamin D deficiency  -     Vitamin D,25-Hydroxy; Future    6. Screening for colon cancer  -     Cologuard - Stool, Per Rectum; Future    7. Need for hepatitis C screening test  -     Hepatitis C Antibody; Future    8. Mixed hyperlipidemia  -     Lipid Panel; Future    Other orders  -     aspirin 81 MG EC tablet; Take 1 tablet by mouth Daily.  Dispense: 90 tablet; Refill: 1  -     insulin glargine (LANTUS, SEMGLEE) 100 UNIT/ML injection; Inject subq up to 80 units daily  Dispense: 75 mL; Refill: 0  -     metFORMIN ER (GLUCOPHAGE-XR) 500 MG 24 hr tablet; Take 1 tablet by mouth Daily With Breakfast.  Dispense: 90 tablet; Refill: 1  -     rosuvastatin (Crestor) 40 MG tablet; Take 1 tablet by mouth Daily.  Dispense: 90 tablet; Refill: 1  -     lisinopril (PRINIVIL,ZESTRIL) 20 MG  tablet; Take 1 tablet by mouth Daily.  Dispense: 90 tablet; Refill: 3  -     amLODIPine (NORVASC) 5 MG tablet; Take 1 tablet by mouth Daily.  Dispense: 90 tablet; Refill: 3  -     hydroCHLOROthiazide (HYDRODIURIL) 25 MG tablet; Take 1 tablet by mouth Daily.  Dispense: 90 tablet; Refill: 3  -     cholecalciferol (VITAMIN D3) 25 MCG (1000 UT) tablet; Take 1 tablet by mouth Daily.  Dispense: 90 tablet; Refill: 3    Plan as documented above.  I discussed with patient, spouse as well as close friend the need for follow-up in 1 month.  We discussed insulin regimen today at length.  We discussed continuing current management for hypertension.  Patient to be switched to Crestor for hyperlipidemia management as well as secondary prevention of strokes.         Follow Up   Return in about 1 month (around 9/30/2023) for stroke.  Patient was given instructions and counseling regarding his condition or for health maintenance advice. Please see specific information pulled into the AVS if appropriate.

## 2023-09-01 ENCOUNTER — TELEPHONE (OUTPATIENT)
Dept: FAMILY MEDICINE CLINIC | Facility: CLINIC | Age: 63
End: 2023-09-01

## 2023-09-01 ENCOUNTER — REFERRAL TRIAGE (OUTPATIENT)
Dept: CASE MANAGEMENT | Facility: OTHER | Age: 63
End: 2023-09-01
Payer: MEDICAID

## 2023-09-01 PROBLEM — E55.9 VITAMIN D DEFICIENCY: Status: ACTIVE | Noted: 2023-09-01

## 2023-09-01 PROBLEM — E11.649 TYPE 2 DIABETES MELLITUS WITH HYPOGLYCEMIA WITHOUT COMA, WITH LONG-TERM CURRENT USE OF INSULIN: Status: ACTIVE | Noted: 2023-09-01

## 2023-09-01 PROBLEM — E78.2 MIXED HYPERLIPIDEMIA: Status: ACTIVE | Noted: 2023-09-01

## 2023-09-01 PROBLEM — Z79.4 TYPE 2 DIABETES MELLITUS WITH HYPOGLYCEMIA WITHOUT COMA, WITH LONG-TERM CURRENT USE OF INSULIN: Status: ACTIVE | Noted: 2023-09-01

## 2023-09-01 NOTE — TELEPHONE ENCOUNTER
"Alie with Physical Therapy is needing order for physical therapy to say \" Out Patient\".  Order states its for Home Health. Please call and advise  [phone]  "

## 2023-09-13 ENCOUNTER — PATIENT OUTREACH (OUTPATIENT)
Dept: CASE MANAGEMENT | Facility: OTHER | Age: 63
End: 2023-09-13
Payer: MEDICAID

## 2023-09-13 DIAGNOSIS — I63.9 CEREBROVASCULAR ACCIDENT (CVA), UNSPECIFIED MECHANISM: Primary | ICD-10-CM

## 2023-09-13 NOTE — OUTREACH NOTE
AMBULATORY CASE MANAGEMENT NOTE    Name and Relationship of Patient/Support Person: PAUL GONZALEZ - Emergency Contact    Patient Outreach    Spoke with Patient's Spouse who is also primary caregiver.     Educated spouse on CCM program - spouse not interested at this time.     Patient working with outpatient physical therapy - [facility] in [place], [place].     Let spouse know that a nurse  and medical social worker are available in the future if she or patient changes their mind. Spouse verbalized understanding and thanked [facility] for the phone call.         Any RAY  Ambulatory Case Management    9/13/2023, 12:59 EDT

## 2023-09-25 ENCOUNTER — TELEPHONE (OUTPATIENT)
Dept: FAMILY MEDICINE CLINIC | Facility: CLINIC | Age: 63
End: 2023-09-25
Payer: MEDICAID

## 2023-09-25 NOTE — TELEPHONE ENCOUNTER
Caller: LISA,NORMA    Relationship: Emergency Contact    Best call back number: [phone]    Requested Prescriptions:   ALL ACTIVE MEDICATIONS     Pharmacy where request should be sent: [id number] - [place], [place] - [address] [facility] AT [facility] [address] /[facility] & [place] - [phone]  - [phone] FX     Last office visit with prescribing clinician: 8/31/2023   Last telemedicine visit with prescribing clinician: Visit date not found   Next office visit with prescribing clinician: 10/5/2023       Does the patient have less than a 3 day supply:  [] Yes  [x] No    Would you like a call back once the refill request has been completed: [] Yes [x] No    If the office needs to give you a call back, can they leave a voicemail: [] Yes [x] No    Sally Reynolds Rep   09/25/23 08:28 EDT

## 2023-09-27 RX ORDER — SENNOSIDES 8.6 MG
650 CAPSULE ORAL EVERY 8 HOURS PRN
Qty: 180 TABLET | Refills: 3 | Status: SHIPPED | OUTPATIENT
Start: 2023-09-27

## 2023-09-27 RX ORDER — AMLODIPINE BESYLATE 5 MG/1
5 TABLET ORAL DAILY
Qty: 90 TABLET | Refills: 3 | Status: SHIPPED | OUTPATIENT
Start: 2023-09-27

## 2023-09-27 RX ORDER — MELATONIN
1000 DAILY
Qty: 90 TABLET | Refills: 3
Start: 2023-09-27

## 2023-09-27 RX ORDER — METFORMIN HYDROCHLORIDE 500 MG/1
500 TABLET, EXTENDED RELEASE ORAL
Qty: 90 TABLET | Refills: 1
Start: 2023-09-27

## 2023-09-27 RX ORDER — INSULIN GLARGINE 100 [IU]/ML
INJECTION, SOLUTION SUBCUTANEOUS
Qty: 75 ML | Refills: 0 | Status: SHIPPED | OUTPATIENT
Start: 2023-09-27

## 2023-09-27 RX ORDER — LISINOPRIL 20 MG/1
20 TABLET ORAL
Qty: 90 TABLET | Refills: 3 | Status: SHIPPED | OUTPATIENT
Start: 2023-09-27

## 2023-09-27 RX ORDER — ASPIRIN 81 MG/1
81 TABLET ORAL DAILY
Qty: 90 TABLET | Refills: 1 | Status: SHIPPED | OUTPATIENT
Start: 2023-09-27

## 2023-09-27 RX ORDER — HYDROCHLOROTHIAZIDE 25 MG/1
25 TABLET ORAL DAILY
Qty: 90 TABLET | Refills: 3
Start: 2023-09-27

## 2023-09-27 RX ORDER — ROSUVASTATIN CALCIUM 40 MG/1
40 TABLET, COATED ORAL DAILY
Qty: 90 TABLET | Refills: 1 | Status: SHIPPED | OUTPATIENT
Start: 2023-09-27

## 2023-09-27 NOTE — TELEPHONE ENCOUNTER
Caller: PAUL GONZALEZ    Relationship: Emergency Contact    Best call back number:     [phone]       What was the call regarding: PAUL STATES THE PATIENT IS RUNNING OUT OF HIS MEDICATIONS AND REALLY NEEDS HIS REFILLS AS SOON AS POSSIBLE.

## 2023-10-05 ENCOUNTER — OFFICE VISIT (OUTPATIENT)
Dept: FAMILY MEDICINE CLINIC | Facility: CLINIC | Age: 63
End: 2023-10-05

## 2023-10-05 VITALS
OXYGEN SATURATION: 100 % | SYSTOLIC BLOOD PRESSURE: 132 MMHG | HEART RATE: 99 BPM | WEIGHT: 164.9 LBS | BODY MASS INDEX: 26.5 KG/M2 | TEMPERATURE: 98.2 F | HEIGHT: 66 IN | DIASTOLIC BLOOD PRESSURE: 86 MMHG

## 2023-10-05 DIAGNOSIS — R53.81 DEBILITY: ICD-10-CM

## 2023-10-05 DIAGNOSIS — R29.898 WEAKNESS OF BOTH LOWER EXTREMITIES: ICD-10-CM

## 2023-10-05 DIAGNOSIS — R47.81 SLURRED SPEECH: ICD-10-CM

## 2023-10-05 DIAGNOSIS — E78.2 MIXED HYPERLIPIDEMIA: ICD-10-CM

## 2023-10-05 DIAGNOSIS — Z87.891 FORMER SMOKER: ICD-10-CM

## 2023-10-05 DIAGNOSIS — Z79.4 TYPE 2 DIABETES MELLITUS WITH HYPERGLYCEMIA, WITH LONG-TERM CURRENT USE OF INSULIN: Primary | ICD-10-CM

## 2023-10-05 DIAGNOSIS — Z12.11 SCREENING FOR COLON CANCER: ICD-10-CM

## 2023-10-05 DIAGNOSIS — R53.1 WEAKNESS: ICD-10-CM

## 2023-10-05 DIAGNOSIS — Z23 NEED FOR TDAP VACCINATION: ICD-10-CM

## 2023-10-05 DIAGNOSIS — I63.9 CEREBROVASCULAR ACCIDENT (CVA), UNSPECIFIED MECHANISM: ICD-10-CM

## 2023-10-05 DIAGNOSIS — Z23 NEED FOR PNEUMOCOCCAL VACCINATION: ICD-10-CM

## 2023-10-05 DIAGNOSIS — R05.3 CHRONIC COUGH: ICD-10-CM

## 2023-10-05 DIAGNOSIS — E11.65 TYPE 2 DIABETES MELLITUS WITH HYPERGLYCEMIA, WITH LONG-TERM CURRENT USE OF INSULIN: Primary | ICD-10-CM

## 2023-10-05 RX ORDER — METFORMIN HYDROCHLORIDE 500 MG/1
500 TABLET, EXTENDED RELEASE ORAL
Qty: 90 TABLET | Refills: 1 | Status: SHIPPED | OUTPATIENT
Start: 2023-10-05

## 2023-10-05 RX ORDER — INSULIN GLARGINE 100 [IU]/ML
INJECTION, SOLUTION SUBCUTANEOUS
COMMUNITY
Start: 2023-09-29

## 2023-10-05 RX ORDER — BLOOD SUGAR DIAGNOSTIC
1 STRIP MISCELLANEOUS DAILY
Qty: 90 EACH | Refills: 3 | Status: SHIPPED | OUTPATIENT
Start: 2023-10-05

## 2023-10-05 RX ORDER — LOSARTAN POTASSIUM 50 MG/1
50 TABLET ORAL DAILY
Qty: 90 TABLET | Refills: 1 | Status: SHIPPED | OUTPATIENT
Start: 2023-10-05

## 2023-10-05 NOTE — PROGRESS NOTES
Chief Complaint  Follow-up, Extremity Weakness, and Cough    Subjective        Italo Pina presents to [facility]  History of Present Illness  Patient follows up today accompanied by his wife, Molly Atwood for a previous stroke.  He previously had a brain MRI that he had a small focus of restricted diffusion in the right kenia suspected to represent an acute/subacute infarct. There is also noted suspected remote microhemorrhages which could be associated with hypertension.  They have brought in a blood pressure log today.  His blood pressure for the most part has been within normal limits and even at times a little bit lower with systolic in the low 100s.  He is currently suppse to be taking lisinopril 20 mg daily as well as amlodipine 5 mg daily and hydrochlorothiazide 25 mg daily.  He has not been on the hydrochlorothiazide for the past couple weeks and is not feeling lightheaded anymore.  I suspect he was being overtreated for hypertension.  We discussed avoiding the hydrochlorothiazide for now.  He also is having a dry cough.  I suspect this is related to lisinopril.  I will get a chest x-ray especially given his smoking history and I will switch him to losartan 50 mg daily.  Plan for close follow-up in this regard.  He is still having issues with slurred speech and weakness.  We discussed setting him up with physical therapy.  We had previously done this however things did not go well at the last physical therapy office.  They were seen at [facility].  They are requesting a referral to [facility] on [facility].  He is now taking 42 units of Lantus daily.  Glucose levels have been running better and for the most part glucose levels less than 130.  He continues to take Crestor 40 mg daily.  He has been tolerating this medication.  He is due for labs but they will return fasting to have these done.  He is also due for his Cologuard test which they have not completed yet.  He is  "due for Tdap and pneumococcal vaccinations today.  Objective   Vital Signs:  /86   Pulse 99   Temp 98.2 °F (36.8 °C)   Ht 167.6 cm (66\")   Wt 74.8 kg (164 lb 14.4 oz)   SpO2 100%   BMI 26.62 kg/m²   Estimated body mass index is 26.62 kg/m² as calculated from the following:    Height as of this encounter: 167.6 cm (66\").    Weight as of this encounter: 74.8 kg (164 lb 14.4 oz).       BMI is >= 25 and <30. (Overweight) The following options were offered after discussion;: exercise counseling/recommendations and nutrition counseling/recommendations      Physical Exam   General: AAO ×3, no acute distress, pleasant  HEENT: Normocephalic, atraumatic  Cardiovascular: Regular rate and rhythm without appreciable murmur  Respiratory: Clear to auscultation bilaterally no RRW  Gastrointestinal: Soft nontender nondistended with bowel sounds present  extremities: No edema  Neurologic: CN II through XII grossly intact.  CN II through XII intact.  Weakness noted in the left upper and left lower extremity with  strength as well as biceps strength, and quadriceps and hamstring strength   Psychiatric: Normal mood and affect  Result Review :                   Assessment and Plan   Diagnoses and all orders for this visit:    1. Type 2 diabetes mellitus with hyperglycemia, with long-term current use of insulin (Primary)    2. Cerebrovascular accident (CVA), unspecified mechanism    3. Mixed hyperlipidemia    4. Chronic cough  -     XR Chest PA & Lateral; Future    5. Slurred speech  -     Ambulatory Referral to Speech Therapy    6. Debility  -     Ambulatory Referral to Physical Therapy Evaluate and treat; Stretching, Strengthening    7. Weakness  -     Ambulatory Referral to Physical Therapy Evaluate and treat; Stretching, Strengthening    8. Weakness of both lower extremities  -     Ambulatory Referral to Physical Therapy Evaluate and treat; Stretching, Strengthening    9. Screening for colon cancer    10. Former " smoker    11. Need for Tdap vaccination  -     Tdap Vaccine => 6yo IM (BOOSTRIX)    12. Need for pneumococcal vaccination  -     Pneumococcal Conjugate Vaccine 20-Valent (PCV20)    Other orders  -     Insulin Pen Needle (Pen Needles) 32G X 5 MM misc; Use 1 Units Daily.  Dispense: 90 each; Refill: 3  -     metFORMIN ER (GLUCOPHAGE-XR) 500 MG 24 hr tablet; Take 1 tablet by mouth Daily With Breakfast.  Dispense: 90 tablet; Refill: 1  -     losartan (Cozaar) 50 MG tablet; Take 1 tablet by mouth Daily.  Dispense: 90 tablet; Refill: 1    I discussed with patient and spouse referral to physical therapy as well as speech therapy.  Chest x-ray has been ordered.  Medication changes have been made as documented above.  I plan to see patient back in 1 month or sooner if needed.  They are instructed to call with any questions or concerns.         Follow Up   Return in about 1 month (around 11/5/2023) for stroke .  Patient was given instructions and counseling regarding his condition or for health maintenance advice. Please see specific information pulled into the AVS if appropriate.